# Patient Record
Sex: FEMALE | HISPANIC OR LATINO | Employment: UNEMPLOYED
[De-identification: names, ages, dates, MRNs, and addresses within clinical notes are randomized per-mention and may not be internally consistent; named-entity substitution may affect disease eponyms.]

---

## 2017-01-04 ENCOUNTER — CHARTING TRANS (OUTPATIENT)
Dept: OTHER | Age: 52
End: 2017-01-04

## 2017-01-09 ENCOUNTER — CHARTING TRANS (OUTPATIENT)
Dept: OTHER | Age: 52
End: 2017-01-09

## 2017-05-03 ENCOUNTER — CHARTING TRANS (OUTPATIENT)
Dept: OTHER | Age: 52
End: 2017-05-03

## 2017-05-09 ENCOUNTER — CHARTING TRANS (OUTPATIENT)
Dept: URGENT CARE | Age: 52
End: 2017-05-09

## 2017-05-09 ASSESSMENT — PAIN SCALES - GENERAL: PAINLEVEL_OUTOF10: 4

## 2017-05-10 ENCOUNTER — CHARTING TRANS (OUTPATIENT)
Dept: INTERNAL MEDICINE | Age: 52
End: 2017-05-10

## 2017-05-10 ENCOUNTER — CHARTING TRANS (OUTPATIENT)
Dept: OTHER | Age: 52
End: 2017-05-10

## 2017-05-11 ENCOUNTER — LAB SERVICES (OUTPATIENT)
Dept: OTHER | Age: 52
End: 2017-05-11

## 2017-05-11 LAB
C PARVUM AG STL QL IA: NEGATIVE
G LAMBLIA AG STL QL IA: NEGATIVE

## 2017-05-17 ENCOUNTER — CHARTING TRANS (OUTPATIENT)
Dept: OTHER | Age: 52
End: 2017-05-17

## 2017-05-23 ENCOUNTER — CHARTING TRANS (OUTPATIENT)
Dept: OTHER | Age: 52
End: 2017-05-23

## 2017-06-02 ENCOUNTER — LAB SERVICES (OUTPATIENT)
Dept: OTHER | Age: 52
End: 2017-06-02

## 2017-06-02 ENCOUNTER — CHARTING TRANS (OUTPATIENT)
Dept: INTERNAL MEDICINE | Age: 52
End: 2017-06-02

## 2017-06-02 ENCOUNTER — CHARTING TRANS (OUTPATIENT)
Dept: OTHER | Age: 52
End: 2017-06-02

## 2017-06-02 LAB
BILIRUBIN URINE: NEGATIVE
BLOOD URINE: ABNORMAL
CLARITY: ABNORMAL
COLOR: YELLOW
GLUCOSE QUALITATIVE U: NEGATIVE
KETONES, URINE: NEGATIVE
LEUKOCYTE ESTERASE URINE: NEGATIVE
NITRITE URINE: NEGATIVE
PH URINE: 6 (ref 5–7)
SPECIFIC GRAVITY URINE: 1.02 (ref 1–1.03)
URINE PROTEIN, QUAL (DIPSTICK): 100
UROBILINOGEN URINE: <2

## 2017-06-05 ENCOUNTER — CHARTING TRANS (OUTPATIENT)
Dept: OTHER | Age: 52
End: 2017-06-05

## 2017-06-05 LAB — FINAL REPORT: NORMAL

## 2017-06-08 ENCOUNTER — CHARTING TRANS (OUTPATIENT)
Dept: OTHER | Age: 52
End: 2017-06-08

## 2017-06-23 ENCOUNTER — CHARTING TRANS (OUTPATIENT)
Dept: OTHER | Age: 52
End: 2017-06-23

## 2017-06-23 ENCOUNTER — LAB SERVICES (OUTPATIENT)
Dept: OTHER | Age: 52
End: 2017-06-23

## 2017-06-23 ENCOUNTER — IMAGING SERVICES (OUTPATIENT)
Dept: OTHER | Age: 52
End: 2017-06-23

## 2017-06-23 LAB
CREATININE, SERUM: 0.65 MG/DL (ref 0.4–1.4)
GFR AFRICAN AMERICAN: NORMAL ML/MIN/1.73SQ.M.
GFR NON AFRICAN AMER: >60 ML/MIN/1.73SQ.M.

## 2017-07-01 ENCOUNTER — HOSPITAL ENCOUNTER (EMERGENCY)
Age: 52
Discharge: HOME | End: 2017-07-01
Attending: EMERGENCY MEDICINE
Payer: COMMERCIAL

## 2017-07-01 ENCOUNTER — APPOINTMENT (OUTPATIENT)
Dept: CT IMAGING | Age: 52
End: 2017-07-01
Attending: EMERGENCY MEDICINE
Payer: COMMERCIAL

## 2017-07-01 VITALS
OXYGEN SATURATION: 98 % | HEART RATE: 72 BPM | RESPIRATION RATE: 20 BRPM | DIASTOLIC BLOOD PRESSURE: 68 MMHG | HEIGHT: 62 IN | BODY MASS INDEX: 33.49 KG/M2 | WEIGHT: 182 LBS | SYSTOLIC BLOOD PRESSURE: 124 MMHG | TEMPERATURE: 98 F

## 2017-07-01 DIAGNOSIS — N20.0 KIDNEY STONE: Primary | ICD-10-CM

## 2017-07-01 LAB
ALBUMIN SERPL-MCNC: 3.4 G/DL (ref 3.5–4.8)
ALP LIVER SERPL-CCNC: 92 U/L (ref 39–100)
ALT SERPL-CCNC: 29 U/L (ref 14–54)
AST SERPL-CCNC: 21 U/L (ref 15–41)
BASOPHILS # BLD AUTO: 0.04 X10(3) UL (ref 0–0.1)
BASOPHILS NFR BLD AUTO: 0.3 %
BILIRUB SERPL-MCNC: 0.3 MG/DL (ref 0.1–2)
BILIRUB UR QL STRIP.AUTO: NEGATIVE
BUN BLD-MCNC: 13 MG/DL (ref 8–20)
CALCIUM BLD-MCNC: 8.7 MG/DL (ref 8.3–10.3)
CHLORIDE: 107 MMOL/L (ref 101–111)
CO2: 26 MMOL/L (ref 22–32)
COLOR UR AUTO: YELLOW
CREAT BLD-MCNC: 0.84 MG/DL (ref 0.55–1.02)
EOSINOPHIL # BLD AUTO: 0.19 X10(3) UL (ref 0–0.3)
EOSINOPHIL NFR BLD AUTO: 1.6 %
ERYTHROCYTE [DISTWIDTH] IN BLOOD BY AUTOMATED COUNT: 13.6 % (ref 11.5–16)
GLUCOSE BLD-MCNC: 138 MG/DL (ref 70–99)
GLUCOSE UR STRIP.AUTO-MCNC: NEGATIVE MG/DL
HCT VFR BLD AUTO: 38 % (ref 34–50)
HGB BLD-MCNC: 12.4 G/DL (ref 12–16)
IMMATURE GRANULOCYTE COUNT: 0.04 X10(3) UL (ref 0–1)
IMMATURE GRANULOCYTE RATIO %: 0.3 %
LEUKOCYTE ESTERASE UR QL STRIP.AUTO: NEGATIVE
LYMPHOCYTES # BLD AUTO: 3.43 X10(3) UL (ref 0.9–4)
LYMPHOCYTES NFR BLD AUTO: 28.7 %
M PROTEIN MFR SERPL ELPH: 7.5 G/DL (ref 6.1–8.3)
MCH RBC QN AUTO: 28.1 PG (ref 27–33.2)
MCHC RBC AUTO-ENTMCNC: 32.6 G/DL (ref 31–37)
MCV RBC AUTO: 86.2 FL (ref 81–100)
MONOCYTES # BLD AUTO: 0.73 X10(3) UL (ref 0.1–0.6)
MONOCYTES NFR BLD AUTO: 6.1 %
NEUTROPHIL ABS PRELIM: 7.53 X10 (3) UL (ref 1.3–6.7)
NEUTROPHILS # BLD AUTO: 7.53 X10(3) UL (ref 1.3–6.7)
NEUTROPHILS NFR BLD AUTO: 63 %
NITRITE UR QL STRIP.AUTO: NEGATIVE
PH UR STRIP.AUTO: 6 [PH] (ref 4.5–8)
PLATELET # BLD AUTO: 308 10(3)UL (ref 150–450)
POTASSIUM SERPL-SCNC: 3.7 MMOL/L (ref 3.6–5.1)
RBC # BLD AUTO: 4.41 X10(6)UL (ref 3.8–5.1)
RBC #/AREA URNS AUTO: >10 /HPF
RED CELL DISTRIBUTION WIDTH-SD: 43 FL (ref 35.1–46.3)
SODIUM SERPL-SCNC: 140 MMOL/L (ref 136–144)
SP GR UR STRIP.AUTO: >=1.03 (ref 1–1.03)
UROBILINOGEN UR STRIP.AUTO-MCNC: 0.2 MG/DL
WBC # BLD AUTO: 12 X10(3) UL (ref 4–13)

## 2017-07-01 PROCEDURE — 74176 CT ABD & PELVIS W/O CONTRAST: CPT | Performed by: EMERGENCY MEDICINE

## 2017-07-01 PROCEDURE — 80053 COMPREHEN METABOLIC PANEL: CPT

## 2017-07-01 PROCEDURE — 96375 TX/PRO/DX INJ NEW DRUG ADDON: CPT

## 2017-07-01 PROCEDURE — 99284 EMERGENCY DEPT VISIT MOD MDM: CPT

## 2017-07-01 PROCEDURE — 80053 COMPREHEN METABOLIC PANEL: CPT | Performed by: EMERGENCY MEDICINE

## 2017-07-01 PROCEDURE — 96374 THER/PROPH/DIAG INJ IV PUSH: CPT

## 2017-07-01 PROCEDURE — 85025 COMPLETE CBC W/AUTO DIFF WBC: CPT | Performed by: EMERGENCY MEDICINE

## 2017-07-01 PROCEDURE — 81001 URINALYSIS AUTO W/SCOPE: CPT | Performed by: EMERGENCY MEDICINE

## 2017-07-01 PROCEDURE — 96361 HYDRATE IV INFUSION ADD-ON: CPT

## 2017-07-01 PROCEDURE — 85025 COMPLETE CBC W/AUTO DIFF WBC: CPT

## 2017-07-01 RX ORDER — HYDROCODONE BITARTRATE AND ACETAMINOPHEN 5; 325 MG/1; MG/1
1 TABLET ORAL EVERY 6 HOURS PRN
Qty: 10 TABLET | Refills: 0 | Status: ON HOLD | OUTPATIENT
Start: 2017-07-01 | End: 2018-09-04

## 2017-07-01 RX ORDER — TAMSULOSIN HYDROCHLORIDE 0.4 MG/1
0.4 CAPSULE ORAL DAILY
Qty: 7 CAPSULE | Refills: 0 | Status: SHIPPED | OUTPATIENT
Start: 2017-07-01 | End: 2017-07-08

## 2017-07-01 RX ORDER — HYDROMORPHONE HYDROCHLORIDE 1 MG/ML
1 INJECTION, SOLUTION INTRAMUSCULAR; INTRAVENOUS; SUBCUTANEOUS ONCE
Status: COMPLETED | OUTPATIENT
Start: 2017-07-01 | End: 2017-07-01

## 2017-07-01 RX ORDER — ONDANSETRON 2 MG/ML
4 INJECTION INTRAMUSCULAR; INTRAVENOUS ONCE
Status: COMPLETED | OUTPATIENT
Start: 2017-07-01 | End: 2017-07-01

## 2017-07-01 RX ORDER — KETOROLAC TROMETHAMINE 10 MG/1
10 TABLET, FILM COATED ORAL EVERY 6 HOURS PRN
Qty: 20 TABLET | Refills: 0 | Status: SHIPPED | OUTPATIENT
Start: 2017-07-01 | End: 2017-07-06

## 2017-07-01 RX ORDER — KETOROLAC TROMETHAMINE 30 MG/ML
30 INJECTION, SOLUTION INTRAMUSCULAR; INTRAVENOUS ONCE
Status: COMPLETED | OUTPATIENT
Start: 2017-07-01 | End: 2017-07-01

## 2017-07-01 NOTE — ED INITIAL ASSESSMENT (HPI)
REPORTS HEMATURIA OVER PAST WEEK AND HAS HAD A CT SCAN THRU [facility].   PT NOW WITH 2 HOUR HX OF RIGHT FLANK PAIN WITH VOMITING

## 2017-07-01 NOTE — ED PROVIDER NOTES
Patient Seen in: [facility] Emergency Department In [place]    History   Patient presents with:  Abdomen/Flank Pain (GI/)    Stated Complaint: Left FLANK PAIN    HPI    This is a pleasant 62-year-old female who presents to the ER complaining of having l ED Triage Vitals [07/01/17 0830]  BP: 134/66  Pulse: 70  Resp: 20  Temp: 98.1 °F (36.7 °C)  Temp src: Oral  SpO2: 99 %  O2 Device: None (Room air)    Current:/66   Pulse 70   Temp 98.1 °F (36.7 °C) (Oral)   Resp 20   Ht 157.5 cm (5' 2\")   Wt 82.6 Abnormality         Status                     ---------                               -----------         ------                     CBC W/ DIFFERENTIAL[[id number]]          Abnormal            Final result                 Please view results for these kandace Clinical Impression:  Kidney stone  (primary encounter diagnosis)    Disposition:  Discharge    Follow-up:  Zainab Bunch MD  [address] [place] [address]  [phone]    Schedule an appointment as soon as possible for a visit in 2 days

## 2017-07-03 ENCOUNTER — CHARTING TRANS (OUTPATIENT)
Dept: UROLOGY | Age: 52
End: 2017-07-03

## 2017-07-03 ENCOUNTER — CHARTING TRANS (OUTPATIENT)
Dept: OTHER | Age: 52
End: 2017-07-03

## 2017-07-03 ENCOUNTER — IMAGING SERVICES (OUTPATIENT)
Dept: OTHER | Age: 52
End: 2017-07-03

## 2017-07-06 ENCOUNTER — CHARTING TRANS (OUTPATIENT)
Dept: OTHER | Age: 52
End: 2017-07-06

## 2017-07-07 ENCOUNTER — CHARTING TRANS (OUTPATIENT)
Dept: OTHER | Age: 52
End: 2017-07-07

## 2017-07-08 ENCOUNTER — LAB SERVICES (OUTPATIENT)
Dept: OTHER | Age: 52
End: 2017-07-08

## 2017-07-08 LAB
ALBUMIN SERPL BCG-MCNC: 3.9 G/DL (ref 3.6–5.1)
ALP SERPL-CCNC: 86 U/L (ref 45–105)
ALT SERPL W/O P-5'-P-CCNC: 41 U/L (ref 15–43)
AST SERPL-CCNC: 26 U/L (ref 14–43)
BASOPHIL %: 0.3 % (ref 0–1.2)
BASOPHIL ABSOLUTE #: 0 10*3/UL (ref 0–0.1)
BILIRUB SERPL-MCNC: 0.5 MG/DL (ref 0–1.3)
BUN SERPL-MCNC: 14 MG/DL (ref 7–20)
CALCIUM SERPL-MCNC: 9 MG/DL (ref 8.6–10.6)
CHLORIDE SERPL-SCNC: 109 MMOL/L (ref 96–107)
CHOLEST SERPL-MCNC: 209 MG/DL (ref 140–200)
CK SERPL-CCNC: 48 U/L (ref 30–135)
CREATININE, SERUM: 0.7 MG/DL (ref 0.5–1.4)
DIFFERENTIAL TYPE: ABNORMAL
EOSINOPHIL %: 2.9 % (ref 0–10)
EOSINOPHIL ABSOLUTE #: 0.4 10*3/UL (ref 0–0.5)
GFR SERPL CREATININE-BSD FRML MDRD: >60 ML/MIN/{1.73M2}
GFR SERPL CREATININE-BSD FRML MDRD: >60 ML/MIN/{1.73M2}
GLUCOSE P FAST SERPL-MCNC: 112 MG/DL (ref 60–100)
HCO3 SERPL-SCNC: 25 MMOL/L (ref 22–32)
HDLC SERPL-MCNC: 39 MG/DL
HEMATOCRIT: 38.5 % (ref 34–45)
HEMOGLOBIN: 12.6 G/DL (ref 11.2–15.7)
LDLC SERPL CALC-MCNC: 134 MG/DL (ref 30–100)
LYMPH PERCENT: 28.3 % (ref 20.5–51.1)
LYMPHOCYTE ABSOLUTE #: 3.4 10*3/UL (ref 1.2–3.4)
MEAN CORPUSCULAR HGB CONCENTRATION: 32.7 % (ref 32–36)
MEAN CORPUSCULAR HGB: 28.4 PG (ref 27–34)
MEAN CORPUSCULAR VOLUME: 86.7 FL (ref 79–95)
MEAN PLATELET VOLUME: 8.6 FL (ref 8.6–12.4)
MONOCYTE ABSOLUTE #: 0.7 10*3/UL (ref 0.2–0.9)
MONOCYTE PERCENT: 5.8 % (ref 4.3–12.9)
NEUTROPHIL ABSOLUTE #: 7.6 10*3/UL (ref 1.4–6.5)
NEUTROPHIL PERCENT: 62.7 % (ref 34–73.5)
PLATELET COUNT: 354 10*3/UL (ref 150–400)
POTASSIUM SERPL-SCNC: 4.2 MMOL/L (ref 3.5–5.3)
PROT SERPL-MCNC: 7.3 G/DL (ref 6.4–8.5)
RED BLOOD CELL COUNT: 4.44 10*6/UL (ref 3.7–5.2)
RED CELL DISTRIBUTION WIDTH: 14.4 % (ref 11.3–14.8)
SODIUM SERPL-SCNC: 144 MMOL/L (ref 136–146)
TRIGL SERPL-MCNC: 181 MG/DL (ref 0–200)
TSH SERPL DL<=0.05 MIU/L-ACNC: 1.02 M[IU]/L (ref 0.3–4.82)
WHITE BLOOD CELL COUNT: 12.1 10*3/UL (ref 4–10)

## 2017-07-10 ENCOUNTER — CHARTING TRANS (OUTPATIENT)
Dept: OTHER | Age: 52
End: 2017-07-10

## 2017-07-11 ENCOUNTER — LAB SERVICES (OUTPATIENT)
Dept: OTHER | Age: 52
End: 2017-07-11

## 2017-07-11 ENCOUNTER — CHARTING TRANS (OUTPATIENT)
Dept: OTHER | Age: 52
End: 2017-07-11

## 2017-07-11 ENCOUNTER — CHARTING TRANS (OUTPATIENT)
Dept: INTERNAL MEDICINE | Age: 52
End: 2017-07-11

## 2017-07-11 LAB
BILIRUBIN URINE: NEGATIVE
BLOOD URINE: ABNORMAL
CLARITY: ABNORMAL
COLOR: YELLOW
GLUCOSE QUALITATIVE U: NEGATIVE
KETONES, URINE: NEGATIVE
LEUKOCYTE ESTERASE URINE: ABNORMAL
NITRITE URINE: NEGATIVE
PH URINE: 5 (ref 5–7)
SPECIFIC GRAVITY URINE: 1.02 (ref 1–1.03)
URINE PROTEIN, QUAL (DIPSTICK): 30
UROBILINOGEN URINE: <2

## 2017-07-12 LAB — FINAL REPORT: NORMAL

## 2017-07-13 ENCOUNTER — CHARTING TRANS (OUTPATIENT)
Dept: OTHER | Age: 52
End: 2017-07-13

## 2017-07-14 ENCOUNTER — CHARTING TRANS (OUTPATIENT)
Dept: OTHER | Age: 52
End: 2017-07-14

## 2017-08-09 ENCOUNTER — IMAGING SERVICES (OUTPATIENT)
Dept: OTHER | Age: 52
End: 2017-08-09

## 2017-08-09 ENCOUNTER — CHARTING TRANS (OUTPATIENT)
Dept: OTHER | Age: 52
End: 2017-08-09

## 2017-08-09 ENCOUNTER — CHARTING TRANS (OUTPATIENT)
Dept: UROLOGY | Age: 52
End: 2017-08-09

## 2017-08-15 ENCOUNTER — CHARTING TRANS (OUTPATIENT)
Dept: OTHER | Age: 52
End: 2017-08-15

## 2017-08-21 ENCOUNTER — IMAGING SERVICES (OUTPATIENT)
Dept: OTHER | Age: 52
End: 2017-08-21

## 2017-11-08 ENCOUNTER — CHARTING TRANS (OUTPATIENT)
Dept: OTHER | Age: 52
End: 2017-11-08

## 2018-02-18 ENCOUNTER — HOSPITAL ENCOUNTER (EMERGENCY)
Age: 53
Discharge: HOME | End: 2018-02-19
Attending: EMERGENCY MEDICINE
Payer: COMMERCIAL

## 2018-02-18 DIAGNOSIS — N23 RENAL COLIC: Primary | ICD-10-CM

## 2018-02-18 PROCEDURE — 96375 TX/PRO/DX INJ NEW DRUG ADDON: CPT

## 2018-02-18 PROCEDURE — 99284 EMERGENCY DEPT VISIT MOD MDM: CPT

## 2018-02-18 PROCEDURE — 96374 THER/PROPH/DIAG INJ IV PUSH: CPT

## 2018-02-19 ENCOUNTER — APPOINTMENT (OUTPATIENT)
Dept: CT IMAGING | Age: 53
End: 2018-02-19
Attending: EMERGENCY MEDICINE
Payer: COMMERCIAL

## 2018-02-19 VITALS
RESPIRATION RATE: 16 BRPM | OXYGEN SATURATION: 99 % | TEMPERATURE: 99 F | HEART RATE: 66 BPM | HEIGHT: 61 IN | DIASTOLIC BLOOD PRESSURE: 71 MMHG | WEIGHT: 245 LBS | SYSTOLIC BLOOD PRESSURE: 128 MMHG | BODY MASS INDEX: 46.26 KG/M2

## 2018-02-19 LAB
BILIRUB UR QL STRIP.AUTO: NEGATIVE
BUN BLD-MCNC: 16 MG/DL (ref 8–20)
CALCIUM BLD-MCNC: 9 MG/DL (ref 8.3–10.3)
CHLORIDE: 107 MMOL/L (ref 101–111)
CO2: 28 MMOL/L (ref 22–32)
COLOR UR AUTO: YELLOW
CREAT BLD-MCNC: 0.99 MG/DL (ref 0.55–1.02)
GLUCOSE BLD-MCNC: 151 MG/DL (ref 70–99)
GLUCOSE UR STRIP.AUTO-MCNC: NEGATIVE MG/DL
LEUKOCYTE ESTERASE UR QL STRIP.AUTO: NEGATIVE
NITRITE UR QL STRIP.AUTO: NEGATIVE
PH UR STRIP.AUTO: 6 [PH] (ref 4.5–8)
POTASSIUM SERPL-SCNC: 3.6 MMOL/L (ref 3.6–5.1)
RBC #/AREA URNS AUTO: >10 /HPF
SODIUM SERPL-SCNC: 142 MMOL/L (ref 136–144)
SP GR UR STRIP.AUTO: >=1.03 (ref 1–1.03)
UROBILINOGEN UR STRIP.AUTO-MCNC: 0.2 MG/DL

## 2018-02-19 PROCEDURE — 81001 URINALYSIS AUTO W/SCOPE: CPT | Performed by: EMERGENCY MEDICINE

## 2018-02-19 PROCEDURE — 80048 BASIC METABOLIC PNL TOTAL CA: CPT | Performed by: EMERGENCY MEDICINE

## 2018-02-19 PROCEDURE — 74176 CT ABD & PELVIS W/O CONTRAST: CPT | Performed by: EMERGENCY MEDICINE

## 2018-02-19 RX ORDER — KETOROLAC TROMETHAMINE 30 MG/ML
30 INJECTION, SOLUTION INTRAMUSCULAR; INTRAVENOUS ONCE
Status: COMPLETED | OUTPATIENT
Start: 2018-02-19 | End: 2018-02-19

## 2018-02-19 RX ORDER — HYDROCODONE BITARTRATE AND ACETAMINOPHEN 5; 325 MG/1; MG/1
1 TABLET ORAL EVERY 6 HOURS PRN
Qty: 20 TABLET | Refills: 0 | Status: SHIPPED | OUTPATIENT
Start: 2018-02-19 | End: 2018-03-01

## 2018-02-19 RX ORDER — ONDANSETRON 2 MG/ML
4 INJECTION INTRAMUSCULAR; INTRAVENOUS ONCE
Status: COMPLETED | OUTPATIENT
Start: 2018-02-19 | End: 2018-02-19

## 2018-02-19 RX ORDER — ONDANSETRON 4 MG/1
4 TABLET, ORALLY DISINTEGRATING ORAL EVERY 4 HOURS PRN
Qty: 10 TABLET | Refills: 0 | Status: SHIPPED | OUTPATIENT
Start: 2018-02-19 | End: 2018-02-26

## 2018-02-19 NOTE — ED PROVIDER NOTES
Patient Seen in: [facility] In [place]    History   Patient presents with:  Abdomen/Flank Pain (GI/)  Nausea/Vomiting/Diarrhea (gastrointestinal)  Headache (neurologic)  Constipation (gastrointestinal)    Stated Complaint: RIGHT JAYCE nontender. Extremities: No evidence of deformity. No clubbing or cyanosis. Neuro: No focal deficit is noted.     ED Course     Labs Reviewed   BASIC METABOLIC PANEL (8) - Abnormal; Notable for the following:        Result Value    Glucose 151 (*)     All

## 2018-02-19 NOTE — ED INITIAL ASSESSMENT (HPI)
Pt to ed co right sided abd pain that started 3 hrs ago after eating pizza states flank pain \"in the middle\" reports 3 emesis no diarrhea reports constipation also states \"bad pain in back of head that started after vomiting

## 2018-03-21 ENCOUNTER — CHARTING TRANS (OUTPATIENT)
Dept: OTHER | Age: 53
End: 2018-03-21

## 2018-03-28 ENCOUNTER — CHARTING TRANS (OUTPATIENT)
Dept: OTHER | Age: 53
End: 2018-03-28

## 2018-04-16 ENCOUNTER — LAB SERVICES (OUTPATIENT)
Dept: OTHER | Age: 53
End: 2018-04-16

## 2018-04-16 LAB
ALBUMIN SERPL-MCNC: 4.1 G/DL (ref 3.6–5.1)
ALP SERPL-CCNC: 107 U/L (ref 45–105)
ALT SERPL-CCNC: 33 U/L (ref 15–43)
AST SERPL-CCNC: 21 U/L (ref 14–43)
BILIRUB SERPL-MCNC: 0.5 MG/DL (ref 0–1.3)
BUN SERPL-MCNC: 14 MG/DL (ref 7–20)
CALCIUM SERPL-MCNC: 9.4 MG/DL (ref 8.6–10.6)
CHLORIDE SERPL-SCNC: 106 MMOL/L (ref 96–107)
CHOLEST SERPL-MCNC: 199 MG/DL (ref 140–200)
CO2 SERPL-SCNC: 25 MMOL/L (ref 22–32)
CREAT SERPL-MCNC: 0.6 MG/DL (ref 0.5–1.4)
GFR SERPL CREATININE-BSD FRML MDRD: >60 ML/MIN/{1.73M2}
GFR SERPL CREATININE-BSD FRML MDRD: >60 ML/MIN/{1.73M2}
GLUCOSE P FAST SERPL-MCNC: 138 MG/DL (ref 60–100)
HDLC SERPL-MCNC: 37 MG/DL
LDLC SERPL CALC-MCNC: 119 MG/DL (ref 30–100)
POTASSIUM SERPL-SCNC: 3.8 MMOL/L (ref 3.5–5.3)
PROT SERPL-MCNC: 7.6 G/DL (ref 6.4–8.5)
SODIUM SERPL-SCNC: 142 MMOL/L (ref 136–146)
TRIGL SERPL-MCNC: 217 MG/DL (ref 0–200)

## 2018-04-19 ENCOUNTER — CHARTING TRANS (OUTPATIENT)
Dept: OTHER | Age: 53
End: 2018-04-19

## 2018-04-19 ENCOUNTER — LAB SERVICES (OUTPATIENT)
Dept: OTHER | Age: 53
End: 2018-04-19

## 2018-04-19 LAB
EST. AVERAGE GLUCOSE BLD GHB EST-MCNC: 134 MG/DL (ref 0–154)
HBA1C BLD-MCNC: 6.3 % (ref 4.2–6)

## 2018-04-20 LAB
ALBUMIN/CREAT UR: 5.5 MG/G (ref 0–30)
CREAT UR-MCNC: 181.8 MG/DL
MICROALBUMIN UR-MCNC: 10 MG/L

## 2018-04-23 ENCOUNTER — CHARTING TRANS (OUTPATIENT)
Dept: OTHER | Age: 53
End: 2018-04-23

## 2018-04-24 ENCOUNTER — CHARTING TRANS (OUTPATIENT)
Dept: OTHER | Age: 53
End: 2018-04-24

## 2018-05-01 ENCOUNTER — CHARTING TRANS (OUTPATIENT)
Dept: OTHER | Age: 53
End: 2018-05-01

## 2018-07-17 ENCOUNTER — CHARTING TRANS (OUTPATIENT)
Dept: OTHER | Age: 53
End: 2018-07-17

## 2018-09-03 ENCOUNTER — HOSPITAL ENCOUNTER (INPATIENT)
Facility: HOSPITAL | Age: 53
LOS: 1 days | Discharge: HOME | DRG: 690 | End: 2018-09-05
Attending: EMERGENCY MEDICINE | Admitting: INTERNAL MEDICINE
Payer: COMMERCIAL

## 2018-09-03 ENCOUNTER — APPOINTMENT (OUTPATIENT)
Dept: CT IMAGING | Age: 53
DRG: 690 | End: 2018-09-03
Attending: EMERGENCY MEDICINE
Payer: COMMERCIAL

## 2018-09-03 DIAGNOSIS — D72.829 LEUKOCYTOSIS, UNSPECIFIED TYPE: ICD-10-CM

## 2018-09-03 DIAGNOSIS — N13.2 URETERAL STONE WITH HYDRONEPHROSIS: Primary | ICD-10-CM

## 2018-09-03 DIAGNOSIS — N12 PYELONEPHRITIS: ICD-10-CM

## 2018-09-03 PROBLEM — R73.9 HYPERGLYCEMIA: Status: ACTIVE | Noted: 2018-09-03

## 2018-09-03 PROBLEM — E87.6 HYPOKALEMIA: Status: ACTIVE | Noted: 2018-09-03

## 2018-09-03 PROBLEM — R79.89 AZOTEMIA: Status: ACTIVE | Noted: 2018-09-03

## 2018-09-03 LAB
ALBUMIN SERPL-MCNC: 3.7 G/DL (ref 3.5–4.8)
ALBUMIN/GLOB SERPL: 0.9 {RATIO} (ref 1–2)
ALP LIVER SERPL-CCNC: 92 U/L (ref 39–100)
ALT SERPL-CCNC: 29 U/L (ref 14–54)
ANION GAP SERPL CALC-SCNC: 10 MMOL/L (ref 0–18)
AST SERPL-CCNC: 20 U/L (ref 15–41)
BASOPHILS # BLD AUTO: 0.05 X10(3) UL (ref 0–0.1)
BASOPHILS NFR BLD AUTO: 0.3 %
BILIRUB SERPL-MCNC: 0.4 MG/DL (ref 0.1–2)
BUN BLD-MCNC: 15 MG/DL (ref 8–20)
BUN/CREAT SERPL: 20.5 (ref 10–20)
CALCIUM BLD-MCNC: 9 MG/DL (ref 8.3–10.3)
CHLORIDE SERPL-SCNC: 106 MMOL/L (ref 101–111)
CO2 SERPL-SCNC: 24 MMOL/L (ref 22–32)
CREAT BLD-MCNC: 0.73 MG/DL (ref 0.55–1.02)
EOSINOPHIL # BLD AUTO: 0.23 X10(3) UL (ref 0–0.3)
EOSINOPHIL NFR BLD AUTO: 1.6 %
ERYTHROCYTE [DISTWIDTH] IN BLOOD BY AUTOMATED COUNT: 13.4 % (ref 11.5–16)
GLOBULIN PLAS-MCNC: 4.1 G/DL (ref 2.5–4)
GLUCOSE BLD-MCNC: 102 MG/DL (ref 70–99)
GLUCOSE UR STRIP.AUTO-MCNC: NEGATIVE MG/DL
HCT VFR BLD AUTO: 39.6 % (ref 34–50)
HGB BLD-MCNC: 12.4 G/DL (ref 12–16)
IMMATURE GRANULOCYTE COUNT: 0.04 X10(3) UL (ref 0–1)
IMMATURE GRANULOCYTE RATIO %: 0.3 %
LEUKOCYTE ESTERASE UR QL STRIP.AUTO: NEGATIVE
LYMPHOCYTES # BLD AUTO: 3.83 X10(3) UL (ref 0.9–4)
LYMPHOCYTES NFR BLD AUTO: 26.1 %
M PROTEIN MFR SERPL ELPH: 7.8 G/DL (ref 6.1–8.3)
MCH RBC QN AUTO: 27.7 PG (ref 27–33.2)
MCHC RBC AUTO-ENTMCNC: 31.3 G/DL (ref 31–37)
MCV RBC AUTO: 88.4 FL (ref 81–100)
MONOCYTES # BLD AUTO: 0.89 X10(3) UL (ref 0.1–1)
MONOCYTES NFR BLD AUTO: 6.1 %
NEUTROPHIL ABS PRELIM: 9.62 X10 (3) UL (ref 1.3–6.7)
NEUTROPHILS # BLD AUTO: 9.62 X10(3) UL (ref 1.3–6.7)
NEUTROPHILS NFR BLD AUTO: 65.6 %
NITRITE UR QL STRIP.AUTO: POSITIVE
OSMOLALITY SERPL CALC.SUM OF ELEC: 291 MOSM/KG (ref 275–295)
PH UR STRIP.AUTO: 5 [PH] (ref 4.5–8)
PLATELET # BLD AUTO: 343 10(3)UL (ref 150–450)
POTASSIUM SERPL-SCNC: 3.5 MMOL/L (ref 3.6–5.1)
RBC # BLD AUTO: 4.48 X10(6)UL (ref 3.8–5.1)
RBC #/AREA URNS AUTO: >10 /HPF
RED CELL DISTRIBUTION WIDTH-SD: 43.5 FL (ref 35.1–46.3)
SODIUM SERPL-SCNC: 140 MMOL/L (ref 136–144)
SP GR UR STRIP.AUTO: >=1.03 (ref 1–1.03)
UROBILINOGEN UR STRIP.AUTO-MCNC: 0.2 MG/DL
WBC # BLD AUTO: 14.7 X10(3) UL (ref 4–13)

## 2018-09-03 PROCEDURE — 74176 CT ABD & PELVIS W/O CONTRAST: CPT | Performed by: EMERGENCY MEDICINE

## 2018-09-03 RX ORDER — KETOROLAC TROMETHAMINE 30 MG/ML
30 INJECTION, SOLUTION INTRAMUSCULAR; INTRAVENOUS ONCE
Status: COMPLETED | OUTPATIENT
Start: 2018-09-03 | End: 2018-09-03

## 2018-09-03 RX ORDER — ONDANSETRON 2 MG/ML
4 INJECTION INTRAMUSCULAR; INTRAVENOUS ONCE
Status: COMPLETED | OUTPATIENT
Start: 2018-09-03 | End: 2018-09-03

## 2018-09-04 ENCOUNTER — ANESTHESIA (OUTPATIENT)
Dept: SURGERY | Facility: HOSPITAL | Age: 53
DRG: 690 | End: 2018-09-04
Payer: COMMERCIAL

## 2018-09-04 ENCOUNTER — ANESTHESIA EVENT (OUTPATIENT)
Dept: SURGERY | Facility: HOSPITAL | Age: 53
DRG: 690 | End: 2018-09-04
Payer: COMMERCIAL

## 2018-09-04 ENCOUNTER — APPOINTMENT (OUTPATIENT)
Dept: GENERAL RADIOLOGY | Facility: HOSPITAL | Age: 53
DRG: 690 | End: 2018-09-04
Attending: UROLOGY
Payer: COMMERCIAL

## 2018-09-04 PROBLEM — E78.00 PURE HYPERCHOLESTEROLEMIA: Chronic | Status: ACTIVE | Noted: 2018-09-04

## 2018-09-04 PROBLEM — D72.829 LEUKOCYTOSIS, UNSPECIFIED TYPE: Status: ACTIVE | Noted: 2018-09-04

## 2018-09-04 PROBLEM — N12 PYELONEPHRITIS: Status: ACTIVE | Noted: 2018-09-04

## 2018-09-04 PROBLEM — I10 ESSENTIAL HYPERTENSION: Chronic | Status: ACTIVE | Noted: 2018-09-04

## 2018-09-04 LAB
ANION GAP SERPL CALC-SCNC: 4 MMOL/L (ref 0–18)
BASOPHILS # BLD AUTO: 0.04 X10(3) UL (ref 0–0.1)
BASOPHILS NFR BLD AUTO: 0.3 %
BUN BLD-MCNC: 11 MG/DL (ref 8–20)
BUN/CREAT SERPL: 18.6 (ref 10–20)
CALCIUM BLD-MCNC: 8.1 MG/DL (ref 8.3–10.3)
CHLORIDE SERPL-SCNC: 113 MMOL/L (ref 101–111)
CO2 SERPL-SCNC: 26 MMOL/L (ref 22–32)
CREAT BLD-MCNC: 0.59 MG/DL (ref 0.55–1.02)
EOSINOPHIL # BLD AUTO: 0.24 X10(3) UL (ref 0–0.3)
EOSINOPHIL NFR BLD AUTO: 1.9 %
ERYTHROCYTE [DISTWIDTH] IN BLOOD BY AUTOMATED COUNT: 13.4 % (ref 11.5–16)
GENTAMICIN PEAK SERPL-MCNC: 8.1 UG/ML (ref 5–10)
GENTAMICIN TROUGH SERPL-MCNC: 0.9 UG/ML (ref 1–2)
GENTAMICIN TROUGH SERPL-MCNC: 1 UG/ML (ref 1–2)
GLUCOSE BLD-MCNC: 94 MG/DL (ref 70–99)
HCT VFR BLD AUTO: 36.9 % (ref 34–50)
HGB BLD-MCNC: 11.8 G/DL (ref 12–16)
IMMATURE GRANULOCYTE COUNT: 0.04 X10(3) UL (ref 0–1)
IMMATURE GRANULOCYTE RATIO %: 0.3 %
LYMPHOCYTES # BLD AUTO: 4.46 X10(3) UL (ref 0.9–4)
LYMPHOCYTES NFR BLD AUTO: 34.4 %
MCH RBC QN AUTO: 28.2 PG (ref 27–33.2)
MCHC RBC AUTO-ENTMCNC: 32 G/DL (ref 31–37)
MCV RBC AUTO: 88.1 FL (ref 81–100)
MONOCYTES # BLD AUTO: 0.83 X10(3) UL (ref 0.1–1)
MONOCYTES NFR BLD AUTO: 6.4 %
NEUTROPHIL ABS PRELIM: 7.34 X10 (3) UL (ref 1.3–6.7)
NEUTROPHILS # BLD AUTO: 7.34 X10(3) UL (ref 1.3–6.7)
NEUTROPHILS NFR BLD AUTO: 56.7 %
OSMOLALITY SERPL CALC.SUM OF ELEC: 295 MOSM/KG (ref 275–295)
PLATELET # BLD AUTO: 273 10(3)UL (ref 150–450)
POTASSIUM SERPL-SCNC: 3.5 MMOL/L (ref 3.6–5.1)
POTASSIUM SERPL-SCNC: 4.1 MMOL/L (ref 3.6–5.1)
RBC # BLD AUTO: 4.19 X10(6)UL (ref 3.8–5.1)
RED CELL DISTRIBUTION WIDTH-SD: 43 FL (ref 35.1–46.3)
SODIUM SERPL-SCNC: 143 MMOL/L (ref 136–144)
WBC # BLD AUTO: 13 X10(3) UL (ref 4–13)

## 2018-09-04 PROCEDURE — 74420 UROGRAPHY RTRGR +-KUB: CPT | Performed by: UROLOGY

## 2018-09-04 PROCEDURE — 0T768DZ DILATION OF RIGHT URETER WITH INTRALUMINAL DEVICE, VIA NATURAL OR ARTIFICIAL OPENING ENDOSCOPIC: ICD-10-PCS | Performed by: UROLOGY

## 2018-09-04 PROCEDURE — BT1DYZZ FLUOROSCOPY OF RIGHT KIDNEY, URETER AND BLADDER USING OTHER CONTRAST: ICD-10-PCS | Performed by: UROLOGY

## 2018-09-04 PROCEDURE — 99220 INITIAL OBSERVATION CARE,LEVL III: CPT | Performed by: HOSPITALIST

## 2018-09-04 DEVICE — STENT URET 6F 22CM INL OPT: Type: IMPLANTABLE DEVICE | Site: URETER | Status: FUNCTIONAL

## 2018-09-04 RX ORDER — DOCUSATE SODIUM 100 MG/1
100 CAPSULE, LIQUID FILLED ORAL 2 TIMES DAILY
Status: DISCONTINUED | OUTPATIENT
Start: 2018-09-04 | End: 2018-09-05

## 2018-09-04 RX ORDER — ACETAMINOPHEN 325 MG/1
650 TABLET ORAL EVERY 4 HOURS PRN
Status: DISCONTINUED | OUTPATIENT
Start: 2018-09-04 | End: 2018-09-05

## 2018-09-04 RX ORDER — MORPHINE SULFATE 4 MG/ML
2 INJECTION, SOLUTION INTRAMUSCULAR; INTRAVENOUS EVERY 2 HOUR PRN
Status: DISCONTINUED | OUTPATIENT
Start: 2018-09-04 | End: 2018-09-04

## 2018-09-04 RX ORDER — POLYETHYLENE GLYCOL 3350 17 G/17G
17 POWDER, FOR SOLUTION ORAL DAILY PRN
Status: DISCONTINUED | OUTPATIENT
Start: 2018-09-04 | End: 2018-09-05

## 2018-09-04 RX ORDER — ALFUZOSIN HYDROCHLORIDE 10 MG/1
10 TABLET, EXTENDED RELEASE ORAL
Status: DISCONTINUED | OUTPATIENT
Start: 2018-09-05 | End: 2018-09-05

## 2018-09-04 RX ORDER — METOCLOPRAMIDE HYDROCHLORIDE 5 MG/ML
10 INJECTION INTRAMUSCULAR; INTRAVENOUS AS NEEDED
Status: DISCONTINUED | OUTPATIENT
Start: 2018-09-04 | End: 2018-09-04 | Stop reason: HOSPADM

## 2018-09-04 RX ORDER — PANTOPRAZOLE SODIUM 40 MG/1
40 TABLET, DELAYED RELEASE ORAL
Status: DISCONTINUED | OUTPATIENT
Start: 2018-09-04 | End: 2018-09-05

## 2018-09-04 RX ORDER — OXYBUTYNIN CHLORIDE 5 MG/1
5 TABLET ORAL EVERY 6 HOURS PRN
Status: DISCONTINUED | OUTPATIENT
Start: 2018-09-04 | End: 2018-09-05

## 2018-09-04 RX ORDER — MORPHINE SULFATE 4 MG/ML
1 INJECTION, SOLUTION INTRAMUSCULAR; INTRAVENOUS EVERY 2 HOUR PRN
Status: DISCONTINUED | OUTPATIENT
Start: 2018-09-04 | End: 2018-09-05

## 2018-09-04 RX ORDER — LOSARTAN POTASSIUM 100 MG/1
100 TABLET ORAL DAILY
Status: DISCONTINUED | OUTPATIENT
Start: 2018-09-04 | End: 2018-09-05

## 2018-09-04 RX ORDER — POTASSIUM CHLORIDE 20 MEQ/1
40 TABLET, EXTENDED RELEASE ORAL EVERY 4 HOURS
Status: COMPLETED | OUTPATIENT
Start: 2018-09-04 | End: 2018-09-04

## 2018-09-04 RX ORDER — SODIUM CHLORIDE 9 MG/ML
INJECTION, SOLUTION INTRAVENOUS CONTINUOUS
Status: ACTIVE | OUTPATIENT
Start: 2018-09-04 | End: 2018-09-04

## 2018-09-04 RX ORDER — ONDANSETRON 2 MG/ML
4 INJECTION INTRAMUSCULAR; INTRAVENOUS EVERY 6 HOURS PRN
Status: DISCONTINUED | OUTPATIENT
Start: 2018-09-04 | End: 2018-09-05

## 2018-09-04 RX ORDER — ACETAMINOPHEN 325 MG/1
650 TABLET ORAL EVERY 6 HOURS PRN
Status: DISCONTINUED | OUTPATIENT
Start: 2018-09-04 | End: 2018-09-04

## 2018-09-04 RX ORDER — TELMISARTAN 80 MG/1
80 TABLET ORAL DAILY
COMMUNITY
End: 2020-11-12 | Stop reason: CLARIF

## 2018-09-04 RX ORDER — METOCLOPRAMIDE HYDROCHLORIDE 5 MG/ML
10 INJECTION INTRAMUSCULAR; INTRAVENOUS EVERY 8 HOURS PRN
Status: DISCONTINUED | OUTPATIENT
Start: 2018-09-04 | End: 2018-09-05

## 2018-09-04 RX ORDER — MORPHINE SULFATE 4 MG/ML
1 INJECTION, SOLUTION INTRAMUSCULAR; INTRAVENOUS EVERY 2 HOUR PRN
Status: DISCONTINUED | OUTPATIENT
Start: 2018-09-04 | End: 2018-09-04

## 2018-09-04 RX ORDER — KETOROLAC TROMETHAMINE 30 MG/ML
30 INJECTION, SOLUTION INTRAMUSCULAR; INTRAVENOUS EVERY 6 HOURS PRN
Status: DISCONTINUED | OUTPATIENT
Start: 2018-09-04 | End: 2018-09-05

## 2018-09-04 RX ORDER — KETOROLAC TROMETHAMINE 30 MG/ML
15 INJECTION, SOLUTION INTRAMUSCULAR; INTRAVENOUS EVERY 6 HOURS PRN
Status: DISCONTINUED | OUTPATIENT
Start: 2018-09-04 | End: 2018-09-05

## 2018-09-04 RX ORDER — SODIUM PHOSPHATE, DIBASIC AND SODIUM PHOSPHATE, MONOBASIC 7; 19 G/133ML; G/133ML
1 ENEMA RECTAL ONCE AS NEEDED
Status: DISCONTINUED | OUTPATIENT
Start: 2018-09-04 | End: 2018-09-05

## 2018-09-04 RX ORDER — MEPERIDINE HYDROCHLORIDE 25 MG/ML
12.5 INJECTION INTRAMUSCULAR; INTRAVENOUS; SUBCUTANEOUS AS NEEDED
Status: DISCONTINUED | OUTPATIENT
Start: 2018-09-04 | End: 2018-09-04 | Stop reason: HOSPADM

## 2018-09-04 RX ORDER — SODIUM CHLORIDE, SODIUM LACTATE, POTASSIUM CHLORIDE, CALCIUM CHLORIDE 600; 310; 30; 20 MG/100ML; MG/100ML; MG/100ML; MG/100ML
INJECTION, SOLUTION INTRAVENOUS CONTINUOUS
Status: DISCONTINUED | OUTPATIENT
Start: 2018-09-04 | End: 2018-09-04 | Stop reason: HOSPADM

## 2018-09-04 RX ORDER — ONDANSETRON 2 MG/ML
4 INJECTION INTRAMUSCULAR; INTRAVENOUS AS NEEDED
Status: DISCONTINUED | OUTPATIENT
Start: 2018-09-04 | End: 2018-09-04 | Stop reason: HOSPADM

## 2018-09-04 RX ORDER — ONDANSETRON 2 MG/ML
4 INJECTION INTRAMUSCULAR; INTRAVENOUS EVERY 6 HOURS PRN
Status: DISCONTINUED | OUTPATIENT
Start: 2018-09-04 | End: 2018-09-04

## 2018-09-04 RX ORDER — MORPHINE SULFATE 4 MG/ML
2 INJECTION, SOLUTION INTRAMUSCULAR; INTRAVENOUS EVERY 2 HOUR PRN
Status: DISCONTINUED | OUTPATIENT
Start: 2018-09-04 | End: 2018-09-05

## 2018-09-04 RX ORDER — MORPHINE SULFATE 4 MG/ML
2 INJECTION, SOLUTION INTRAMUSCULAR; INTRAVENOUS EVERY 5 MIN PRN
Status: DISCONTINUED | OUTPATIENT
Start: 2018-09-04 | End: 2018-09-04 | Stop reason: HOSPADM

## 2018-09-04 RX ORDER — LIDOCAINE HYDROCHLORIDE 20 MG/ML
JELLY TOPICAL AS NEEDED
Status: DISCONTINUED | OUTPATIENT
Start: 2018-09-04 | End: 2018-09-04 | Stop reason: HOSPADM

## 2018-09-04 RX ORDER — BISACODYL 10 MG
10 SUPPOSITORY, RECTAL RECTAL
Status: DISCONTINUED | OUTPATIENT
Start: 2018-09-04 | End: 2018-09-05

## 2018-09-04 RX ORDER — PHENAZOPYRIDINE HYDROCHLORIDE 100 MG/1
100 TABLET, FILM COATED ORAL 3 TIMES DAILY PRN
Status: DISCONTINUED | OUTPATIENT
Start: 2018-09-04 | End: 2018-09-05

## 2018-09-04 RX ORDER — DOCUSATE SODIUM 100 MG/1
100 CAPSULE, LIQUID FILLED ORAL 2 TIMES DAILY
Status: DISCONTINUED | OUTPATIENT
Start: 2018-09-04 | End: 2018-09-04

## 2018-09-04 RX ORDER — MIDAZOLAM HYDROCHLORIDE 1 MG/ML
1 INJECTION INTRAMUSCULAR; INTRAVENOUS EVERY 5 MIN PRN
Status: DISCONTINUED | OUTPATIENT
Start: 2018-09-04 | End: 2018-09-04 | Stop reason: HOSPADM

## 2018-09-04 RX ORDER — ONDANSETRON 4 MG/1
4 TABLET, FILM COATED ORAL EVERY 6 HOURS PRN
Status: DISCONTINUED | OUTPATIENT
Start: 2018-09-04 | End: 2018-09-05

## 2018-09-04 RX ORDER — LOVASTATIN 40 MG/1
40 TABLET ORAL NIGHTLY
COMMUNITY
End: 2020-11-12 | Stop reason: CLARIF

## 2018-09-04 RX ORDER — NALOXONE HYDROCHLORIDE 0.4 MG/ML
80 INJECTION, SOLUTION INTRAMUSCULAR; INTRAVENOUS; SUBCUTANEOUS AS NEEDED
Status: DISCONTINUED | OUTPATIENT
Start: 2018-09-04 | End: 2018-09-04 | Stop reason: HOSPADM

## 2018-09-04 RX ORDER — MORPHINE SULFATE 4 MG/ML
4 INJECTION, SOLUTION INTRAMUSCULAR; INTRAVENOUS EVERY 2 HOUR PRN
Status: DISCONTINUED | OUTPATIENT
Start: 2018-09-04 | End: 2018-09-05

## 2018-09-04 RX ORDER — SODIUM CHLORIDE 9 MG/ML
INJECTION, SOLUTION INTRAVENOUS CONTINUOUS
Status: DISCONTINUED | OUTPATIENT
Start: 2018-09-04 | End: 2018-09-05

## 2018-09-04 RX ORDER — SODIUM CHLORIDE, SODIUM LACTATE, POTASSIUM CHLORIDE, CALCIUM CHLORIDE 600; 310; 30; 20 MG/100ML; MG/100ML; MG/100ML; MG/100ML
INJECTION, SOLUTION INTRAVENOUS CONTINUOUS
Status: DISCONTINUED | OUTPATIENT
Start: 2018-09-04 | End: 2018-09-05

## 2018-09-04 RX ORDER — MORPHINE SULFATE 4 MG/ML
4 INJECTION, SOLUTION INTRAMUSCULAR; INTRAVENOUS EVERY 2 HOUR PRN
Status: DISCONTINUED | OUTPATIENT
Start: 2018-09-04 | End: 2018-09-04

## 2018-09-04 NOTE — H&P
[facility]  History and Physical     [facility] Patient Status:  Observation    1965 MRN [id number]   [facility] 5NW-A Attending Kristeen Hashimoto, MD   Hosp Day # 0 PCP Siena Sterling MD     Chief Complaint: Right Review of Systems:   A comprehensive 14 point review of systems was completed. Pertinent positives and negatives noted in the HPI.     Physical Exam:    BP (!) 153/96 (BP Location: Right arm)   Pulse 62   Temp 97.9 °F (36.6 °C) (Oral)   Resp 20   W on ARB  4. Hyperlipidemia-we will continue on statin therapy  5. GERD-we will continue on PPI  6. Obesity-is 33.77    Quality:  · DVT Prophylaxis: SCDs  · CODE status: Full  · Snell: None    Plan of care discussed with patient at bedside.     Ibeth Martins

## 2018-09-04 NOTE — ANESTHESIA POSTPROCEDURE EVALUATION
[facility]    Pranay Christensen Patient Status:  Observation   Age/Gender 50year old female MRN [id number]   [facility] SURGERY Attending Manjeet Ha MD   Hosp Day # 0 PCP Namita Mao MD       Anesthesia Post-op Note    Proced

## 2018-09-04 NOTE — PROGRESS NOTES
[facility] Dosing Service    Initial Pharmacokinetic Consult for Aminoglycoside Dosing      Pranay Christensen is a 50year old female who is being treated for pyelonephritis. Pharmacy has been asked to dose gentamicin by Dr. Rainer Farmer.     She has No Kno PharmD  9/4/2018  4:28 AM

## 2018-09-04 NOTE — ED NOTES
Report called to Christina REYNOLDS RN, room 509. Pt to transport to [facility] by private car w/family. Pt denies pain at time of dc.

## 2018-09-04 NOTE — PLAN OF CARE
NURSING ADMISSION NOTE      Patient admitted via wheelchair. Oriented to room. Safety precautions initiated. Bed in low position. Call light in reach. NPO except sips, IVF infusing. Assess/monitor PRN pain meds. Monitor output strain urine.  Labs

## 2018-09-04 NOTE — CONSULTS
[facility] Urology   Consultation Note    Sebas Coreas Patient Status:  Observation    1965 MRN [id number]   [facility] 5NW-A Attending Olga Hammonds MD   Hosp Day # 0 PCP Lucius Morales MD     Reason for Consultat breast CA   • Diabetes Brother       reports that she has never smoked. She has never used smokeless tobacco. She reports that she does not drink alcohol or use drugs.     Allergies:  No Known Allergies    Medications:    Current Facility-Administered Medic Location: Right arm)   Pulse 65   Temp 97.7 °F (36.5 °C) (Oral)   Resp 20   Ht 5' 2\" (1.575 m)   Wt 178 lb 11.2 oz (81.1 kg)   SpO2 97%   BMI 32.68 kg/m²   GENERAL: The patient is resting in bed in no acute distress. HEENT: Unremarkable. NECK: Supple. Moderate degenerative changes. PELVIC ORGANS:  Surgically absent uterus. Similar circumscribed low attenuation mass right adnexa/ovary. This appears unchanged measuring 5.0 x 4.7 cm. Hounsfield units recorded at 4.   LUNG BASES:  No consolidation or ple obstruction and pain, for that reason a ureteral stent is often left in place afterwards to alleviate those symptoms. Also, the stent allows easy access back into the kidney should there be residual stones to treat.  The stent must be removed within 3 month

## 2018-09-04 NOTE — ANESTHESIA PREPROCEDURE EVALUATION
PRE-OP EVALUATION    Patient Name: Neto Brown    Pre-op Diagnosis: UTI (urinary tract infection) [N39.0]  Right ureteral stone [N20.1]    Procedure(s):  CYSTOSCOPY, RIGHT URETERAL RETROGRADE PYELOGRAM, POSSIBLE RIGHT URETEROSCOPY, POSSIBLE RIGHT URET 1 g in sodium chloride 0.9 % 100 mL MBP/ADD-vantage 1 g Intravenous Q24H   [COMPLETED] Potassium Chloride ER (K-DUR M20) CR tab 40 mEq 40 mEq Oral Q4H   [COMPLETED] sodium chloride 0.9% IV bolus 1,000 mL 1,000 mL Intravenous Once   [COMPLETED] ondansetron 09/04/2018       Lab Results  Component Value Date    09/04/2018   K 3.5 (L) 09/04/2018    (H) 09/04/2018   CO2 26.0 09/04/2018   BUN 11 09/04/2018   CREATSERUM 0.59 09/04/2018   GLU 94 09/04/2018   CA 8.1 (L) 09/04/2018            Airway

## 2018-09-04 NOTE — ED PROVIDER NOTES
Patient Seen in: [facility] In [place]    History   Patient presents with:  Abdomen/Flank Pain (GI/)    Stated Complaint: Rt flank pain    HPI    Right flank pain for the past 3 days - hx of ureteral stones and last required surgica Course     Labs Reviewed   URINALYSIS WITH CULTURE REFLEX - Abnormal; Notable for the following:        Result Value    Urine Color Brown (*)     Clarity Urine Cloudy (*)     Bilirubin Urine Small (*)     Ketones Urine Trace (*)     Blood Urine Large (*) some improvement in pain. Admission disposition: 9/3/2018  9:29 PM          Review of the patient's chart, she follows with Dr. Mo Faust at [facility].   PCP is out of the [facility] as well    Pt desires admission to [facility]

## 2018-09-04 NOTE — PAYOR COMM NOTE
--------------  ADMISSION REVIEW     Payor: [address]  Subscriber #:  [id number]  Authorization Number: N/A    Admit date: 9/4/18  Admit time: 4125       Admitting Physician: Jamaal Knapp MD  Attending Physician:  Meredith Thomas MD  [facility] limits and her neck is supple her lungs are clear to auscultation her heart has regular rate and rhythm without murmurs rubs or gallops her abdomen she has tenderness to palpation over the right upper mid and lower abdomen as well as right CVA tenderness t [facility]   PCP is out of the [facility] as well    Pt desires admission to [address]  over [facility] urology) spoke with hospitalist and Dr Geetha Pinon - requested gentamicin addition to rocephin    Disposition and Plan   C Calculus of kidney    • Esophageal reflux    • Essential hypertension    • GERD     intermittent   • High blood pressure    • High cholesterol    • HYPERLIPIDEMIA    • Hyperlipidemia    • Kidney stone       Past Surgical History: Past Surgical History:  No Labs:  Recent Labs   Lab  09/03/18 1919   WBC  14.7*   HGB  12.4   MCV  88.4   PLT  343.0     GLU  102*   BUN  15   CREATSERUM  0.73   GFRAA  111   GFRNAA  96   CA  9.0   ALB  3.7   NA  140   K  3.5*   CL  106   CO2  24.0   ALKPHO  92   AST  20   AL [facility]) injection 4 mg     Date Action Dose Route User    9/3/2018 1920 Given 4 mg Intravenous Yelitza Christian, RN      Pantoprazole Sodium (PROTONIX) EC tab 40 mg     Date Action Dose Route User    9/4/2018 0547 Given 40 mg Oral Julia Mays, ALEX      0.9

## 2018-09-04 NOTE — ED PROVIDER NOTES
Patient Seen in: [facility] In [place]    History   Patient presents with:  Abdomen/Flank Pain (GI/)    Stated Complaint: Rt flank pain    HPI    Right flank pain with history of ureteral stones- worsening pain over 3 days with bloo

## 2018-09-04 NOTE — BRIEF OP NOTE
Pre-Operative Diagnosis: UTI (urinary tract infection) [N39.0], pyelonephritis  Right ureteral stone [N20.1], right hydronephrosis, right flank pain     Post-Operative Diagnosis: UTI (urinary tract infection) [N39.0], pyelonephritis  Right ureteral stone [

## 2018-09-05 VITALS
HEIGHT: 62 IN | DIASTOLIC BLOOD PRESSURE: 78 MMHG | HEART RATE: 70 BPM | BODY MASS INDEX: 32.88 KG/M2 | OXYGEN SATURATION: 96 % | WEIGHT: 178.69 LBS | TEMPERATURE: 98 F | RESPIRATION RATE: 20 BRPM | SYSTOLIC BLOOD PRESSURE: 142 MMHG

## 2018-09-05 PROCEDURE — 99239 HOSP IP/OBS DSCHRG MGMT >30: CPT | Performed by: HOSPITALIST

## 2018-09-05 RX ORDER — ACETAMINOPHEN AND CODEINE PHOSPHATE 300; 30 MG/1; MG/1
TABLET ORAL
Qty: 20 TABLET | Refills: 0 | Status: SHIPPED | OUTPATIENT
Start: 2018-09-05 | End: 2020-11-09

## 2018-09-05 RX ORDER — PHENAZOPYRIDINE HYDROCHLORIDE 100 MG/1
100 TABLET, FILM COATED ORAL 3 TIMES DAILY PRN
Qty: 15 TABLET | Refills: 1 | Status: SHIPPED | OUTPATIENT
Start: 2018-09-05 | End: 2020-11-09

## 2018-09-05 RX ORDER — CEPHALEXIN 500 MG/1
500 CAPSULE ORAL 3 TIMES DAILY
Qty: 42 CAPSULE | Refills: 0 | Status: SHIPPED | OUTPATIENT
Start: 2018-09-05 | End: 2018-10-04 | Stop reason: ALTCHOICE

## 2018-09-05 NOTE — PLAN OF CARE
Patient/Family Goals    • Patient/Family Long Term Goal Progressing    • Patient/Family Short Term Goal Progressing          Pt is alert and oriented x4. VSS. Maintaining 02 sats on room air. No c/o sob. Electrolytes replaced per order.  Pt NPO for cystosco

## 2018-09-05 NOTE — CM/SW NOTE
09/04/18 1900   CM/SW Screening   Referral Source Physician   Information Source (Patient)   Patient's Mental Status Alert;Oriented   Patient lives with Spouse   Patient Status Prior to Admission   Independent with ADLs and Mobility Yes     Late Entry:

## 2018-09-05 NOTE — PROGRESS NOTES
Multidisciplinary Discharge Rounds held 9/4/2018. Treatment team members present today include , , Charge Nurse,  Nurse, RT, PT and Pharmacy caring for [employer].      Other care providers present:    Patient Active Proble

## 2018-09-05 NOTE — PAYOR COMM NOTE
--------------  CONTINUED STAY REVIEW    Payor: [employer]  Subscriber #:  [id number]  Authorization Number: N/A    Admit date: 9/4/18  Admit time: 9514    Admitting Physician: Ryley Vargas MD  Attending Physician:  Annabel Anton MD  Pr User    9/4/2018 1847 Given 25 mcg Intravenous LishaiopKeven marie, RN      Gentamicin Sulfate (GARAMYCIN) 90 mg in sodium chloride 0.9% 100 mL IVPB     Date Action Dose Route User    9/5/2018 0542 New Bag 90 mg Intravenous Edna Awtood RN    9/4/2018

## 2018-09-05 NOTE — PROGRESS NOTES
[facility] Dosing Service    Follow-up Pharmacokinetic Consult for Aminoglycoside Dosing    Stanton Lyon is a 50year old female who is being treated for pyelonephritis. Patient is on day 2 of gentamicin 90 mg IV every 8 hours.   Goal peak/trough is: mcg/mL. 3.  Pharmacy will need BUN/SCr daily while on gentamicin to assess renal function. 4.  Pharmacy will follow and monitor renal function changes, toxicity and efficacy. Pharmacy will continue to follow her.   We appreciate the opportunity to

## 2018-09-05 NOTE — PLAN OF CARE
Assumed care at 2130  A/ox4, vsson room air. Pyridium given for burning with urination.  Urine strained, no stone seen at this time  PAIN - ADULT    • Verbalizes/displays adequate comfort level or patient's stated pain goal Progressing        Patient/Family

## 2018-09-05 NOTE — PROGRESS NOTES
Pharmacy Progress Note: Discharge Medication Counseling    Brigette Treadwell has been counseled on 6 medications. Of these medications, the following are new to the patient:    1. Cephalexin    2. Acetaminophen-Codeine     3.  Phenazopyridine     The indic

## 2018-09-05 NOTE — PLAN OF CARE
PAIN - ADULT    • Verbalizes/displays adequate comfort level or patient's stated pain goal Progressing        Patient/Family Goals    • Patient/Family Long Term Goal Progressing    • Patient/Family Short Term Goal Progressing          Multidisciplinary Dis

## 2018-09-05 NOTE — PROGRESS NOTES
[facility]  Urology Progress Note    Iraida Concepcion Patient Status:  Inpatient    1965 MRN [id number]   [facility] 5NW-A Attending Luz Maria Fitzgerald MD   Hosp Day # 0 PCP Alexys Mclain MD     Subjective:  [id number] [facility]

## 2018-09-05 NOTE — PROGRESS NOTES
Patient seen and examined    S- no complaints    General- NAD  Chest- CTAB  CVS- RRR  Abdo- soft, NT, BS+    Plan  Ok to [employer] home    Azeem Leon M.D.   [facility]

## 2018-09-06 NOTE — DISCHARGE SUMMARY
[facility]  DISCHARGE SUMMARY     Macrina Birmingham Patient Status:  Inpatient    1965 MRN [id number]   [facility] 5NW-A Attending No att. providers found   Hosp Day # 1 PCP Mesha Lewis MD     Date of Admission: 9/3/201 · none    Consultants:  • Urology    Discharge Medication List:     Discharge Medications      START taking these medications      Instructions Prescription details   Acetaminophen-Codeine #3 300-30 MG Tabs  Commonly known as:  TYLENOL #3      1-2 tablet rate and rhythm. No murmurs, rubs or gallops. Abdomen: Soft, nontender, nondistended. Positive bowel sounds. No rebound or guarding. Neurologic: No focal neurological deficits. Musculoskeletal: Moves all extremities. Extremities: No edema.   --------

## 2018-09-07 NOTE — OPERATIVE REPORT
[facility]    PATIENT'S NAME: Zainab Schaefer   ATTENDING PHYSICIAN: Bisi Leyva M.D. OPERATING PHYSICIAN: Tom Martinez M.D.    PATIENT ACCOUNT#:   [de-identified]    LOCATION:  [address] [place] [address]  MEDICAL RECORD #:   [id number]       DATE OF BIRTH:  13/ noting a bifurcation of the ureter in the upper third. The lower pole moiety had the hydroureteronephrosis down to an obstructing stone just above the Y communication.   An angled Glidewire was then used to advance into the lower pole moiety ureter success

## 2018-09-07 NOTE — PAYOR COMM NOTE
--------------  DISCHARGE REVIEW    Payor: [facility]  Subscriber #:  [id number]  Authorization Number: [id number]    Admit date: 9/3/2018  Admit time:  11:47 PM  Discharge Date: 9/5/2018  1:45 PM     Admitting Physician: Heidy Thompson MD pain, shortness of breath lightheadedness, or syncope. EKG, melena, hematuria. Brief Synopsis: Patient is a 59-year-old female admitted with right-sided nephrolithiasis.   She underwent a cystoscopy, right RG PEG, and insertion of a right urethral stent Caps  · Phenazopyridine HCl 100 MG Tabs     Please  your prescriptions at the location directed by your doctor or nurse    Bring a paper prescription for each of these medications  · Acetaminophen-Codeine #3 300-30 MG Tabs         [place]/

## 2018-09-17 PROCEDURE — 87077 CULTURE AEROBIC IDENTIFY: CPT | Performed by: UROLOGY

## 2018-09-17 PROCEDURE — 87186 SC STD MICRODIL/AGAR DIL: CPT | Performed by: UROLOGY

## 2018-09-17 PROCEDURE — 81015 MICROSCOPIC EXAM OF URINE: CPT | Performed by: UROLOGY

## 2018-09-17 PROCEDURE — 87086 URINE CULTURE/COLONY COUNT: CPT | Performed by: UROLOGY

## 2018-09-27 PROCEDURE — 87086 URINE CULTURE/COLONY COUNT: CPT | Performed by: UROLOGY

## 2018-09-27 PROCEDURE — 81015 MICROSCOPIC EXAM OF URINE: CPT | Performed by: UROLOGY

## 2018-10-01 ENCOUNTER — CHARTING TRANS (OUTPATIENT)
Dept: OTHER | Age: 53
End: 2018-10-01

## 2018-10-03 ENCOUNTER — LAB SERVICES (OUTPATIENT)
Dept: OTHER | Age: 53
End: 2018-10-03

## 2018-10-03 ENCOUNTER — CHARTING TRANS (OUTPATIENT)
Dept: OTHER | Age: 53
End: 2018-10-03

## 2018-10-03 ENCOUNTER — IMAGING SERVICES (OUTPATIENT)
Dept: OTHER | Age: 53
End: 2018-10-03

## 2018-10-03 LAB
BUN SERPL-MCNC: 17 MG/DL (ref 7–20)
CALCIUM SERPL-MCNC: 9.6 MG/DL (ref 8.6–10.6)
CHLORIDE SERPL-SCNC: 106 MMOL/L (ref 96–107)
CO2 SERPL-SCNC: 25 MMOL/L (ref 22–32)
CREAT SERPL-MCNC: 0.7 MG/DL (ref 0.5–1.4)
DIFFERENTIAL TYPE: ABNORMAL
EST. AVERAGE GLUCOSE BLD GHB EST-MCNC: 115 MG/DL (ref 0–154)
GFR SERPL CREATININE-BSD FRML MDRD: >60 ML/MIN/{1.73M2}
GFR SERPL CREATININE-BSD FRML MDRD: >60 ML/MIN/{1.73M2}
GLUCOSE SERPL-MCNC: 151 MG/DL (ref 70–200)
HBA1C BLD-MCNC: 5.6 % (ref 4.2–6)
HEMATOCRIT: 39.8 % (ref 34–45)
HEMOGLOBIN: 12.8 G/DL (ref 11.2–15.7)
LYMPH PERCENT: 28.6 % (ref 20.5–51.1)
LYMPHOCYTE ABSOLUTE #: 3.8 10*3/UL (ref 1.2–3.4)
MEAN CORPUSCULAR HGB CONCENTRATION: 32.2 % (ref 32–36)
MEAN CORPUSCULAR HGB: 28.5 PG (ref 27–34)
MEAN CORPUSCULAR VOLUME: 88.6 FL (ref 79–95)
MEAN PLATELET VOLUME: 8.4 FL (ref 8.6–12.4)
MIXED %: 6.1 % (ref 4.3–12.9)
MIXED ABSOLUTE #: 0.8 10*3/UL (ref 0.2–0.9)
NEUTROPHIL ABSOLUTE #: 8.7 10*3/UL (ref 1.4–6.5)
NEUTROPHIL PERCENT: 65.3 % (ref 34–73.5)
PLATELET COUNT: 375 10*3/UL (ref 150–400)
POTASSIUM SERPL-SCNC: 4.6 MMOL/L (ref 3.5–5.3)
RED BLOOD CELL COUNT: 4.49 10*6/UL (ref 3.7–5.2)
RED CELL DISTRIBUTION WIDTH: 13.4 % (ref 11.3–14.8)
SODIUM SERPL-SCNC: 141 MMOL/L (ref 136–146)
WHITE BLOOD CELL COUNT: 13.3 10*3/UL (ref 4–10)

## 2018-10-04 ENCOUNTER — CHARTING TRANS (OUTPATIENT)
Dept: OTHER | Age: 53
End: 2018-10-04

## 2018-10-05 ENCOUNTER — ANESTHESIA (OUTPATIENT)
Dept: SURGERY | Facility: HOSPITAL | Age: 53
End: 2018-10-05
Payer: COMMERCIAL

## 2018-10-05 ENCOUNTER — ANESTHESIA EVENT (OUTPATIENT)
Dept: SURGERY | Facility: HOSPITAL | Age: 53
End: 2018-10-05
Payer: COMMERCIAL

## 2018-10-05 ENCOUNTER — HOSPITAL ENCOUNTER (OUTPATIENT)
Facility: HOSPITAL | Age: 53
Setting detail: HOSPITAL OUTPATIENT SURGERY
Discharge: HOME | End: 2018-10-05
Attending: UROLOGY | Admitting: UROLOGY
Payer: COMMERCIAL

## 2018-10-05 ENCOUNTER — APPOINTMENT (OUTPATIENT)
Dept: GENERAL RADIOLOGY | Facility: HOSPITAL | Age: 53
End: 2018-10-05
Attending: UROLOGY
Payer: COMMERCIAL

## 2018-10-05 VITALS
TEMPERATURE: 99 F | WEIGHT: 176.38 LBS | BODY MASS INDEX: 32.46 KG/M2 | OXYGEN SATURATION: 100 % | RESPIRATION RATE: 18 BRPM | SYSTOLIC BLOOD PRESSURE: 110 MMHG | HEART RATE: 74 BPM | DIASTOLIC BLOOD PRESSURE: 73 MMHG | HEIGHT: 62 IN

## 2018-10-05 DIAGNOSIS — N20.1 CALCULUS OF URETER: ICD-10-CM

## 2018-10-05 PROCEDURE — 76000 FLUOROSCOPY <1 HR PHYS/QHP: CPT | Performed by: UROLOGY

## 2018-10-05 PROCEDURE — 0TF68ZZ FRAGMENTATION IN RIGHT URETER, VIA NATURAL OR ARTIFICIAL OPENING ENDOSCOPIC: ICD-10-PCS | Performed by: UROLOGY

## 2018-10-05 PROCEDURE — 0T768DZ DILATION OF RIGHT URETER WITH INTRALUMINAL DEVICE, VIA NATURAL OR ARTIFICIAL OPENING ENDOSCOPIC: ICD-10-PCS | Performed by: UROLOGY

## 2018-10-05 DEVICE — STENT URET 6F 24CM ULSMTH: Type: IMPLANTABLE DEVICE | Site: URETER | Status: FUNCTIONAL

## 2018-10-05 RX ORDER — ONDANSETRON 2 MG/ML
4 INJECTION INTRAMUSCULAR; INTRAVENOUS AS NEEDED
Status: DISCONTINUED | OUTPATIENT
Start: 2018-10-05 | End: 2018-10-05

## 2018-10-05 RX ORDER — SODIUM CHLORIDE, SODIUM LACTATE, POTASSIUM CHLORIDE, CALCIUM CHLORIDE 600; 310; 30; 20 MG/100ML; MG/100ML; MG/100ML; MG/100ML
INJECTION, SOLUTION INTRAVENOUS CONTINUOUS
Status: DISCONTINUED | OUTPATIENT
Start: 2018-10-05 | End: 2018-10-05

## 2018-10-05 RX ORDER — LIDOCAINE HYDROCHLORIDE 20 MG/ML
JELLY TOPICAL AS NEEDED
Status: DISCONTINUED | OUTPATIENT
Start: 2018-10-05 | End: 2018-10-05 | Stop reason: HOSPADM

## 2018-10-05 RX ORDER — METHENAMINE, SODIUM PHOSPHATE, MONOBASIC, MONOHYDRATE, PHENYL SALICYLATE, METHYLENE BLUE, AND HYOSCYAMINE SULFATE 120; 40.8; 36; 10; .12 MG/1; MG/1; MG/1; MG/1; MG/1
1 CAPSULE ORAL 3 TIMES DAILY
Qty: 12 CAPSULE | Refills: 1 | Status: SHIPPED | OUTPATIENT
Start: 2018-10-05 | End: 2018-10-10

## 2018-10-05 RX ORDER — MORPHINE SULFATE 4 MG/ML
2 INJECTION, SOLUTION INTRAMUSCULAR; INTRAVENOUS EVERY 5 MIN PRN
Status: DISCONTINUED | OUTPATIENT
Start: 2018-10-05 | End: 2018-10-05

## 2018-10-05 RX ORDER — HYDROCODONE BITARTRATE AND ACETAMINOPHEN 5; 325 MG/1; MG/1
1 TABLET ORAL AS NEEDED
Status: DISCONTINUED | OUTPATIENT
Start: 2018-10-05 | End: 2018-10-05

## 2018-10-05 RX ORDER — CEFAZOLIN SODIUM/WATER 2 G/20 ML
2 SYRINGE (ML) INTRAVENOUS ONCE
Status: COMPLETED | OUTPATIENT
Start: 2018-10-05 | End: 2018-10-05

## 2018-10-05 RX ORDER — TAMSULOSIN HYDROCHLORIDE 0.4 MG/1
0.4 CAPSULE ORAL EVERY EVENING
Qty: 20 CAPSULE | Refills: 1 | Status: SHIPPED | OUTPATIENT
Start: 2018-10-05 | End: 2018-10-25

## 2018-10-05 RX ORDER — HYDROCODONE BITARTRATE AND ACETAMINOPHEN 5; 325 MG/1; MG/1
2 TABLET ORAL AS NEEDED
Status: DISCONTINUED | OUTPATIENT
Start: 2018-10-05 | End: 2018-10-05

## 2018-10-05 RX ORDER — NALOXONE HYDROCHLORIDE 0.4 MG/ML
80 INJECTION, SOLUTION INTRAMUSCULAR; INTRAVENOUS; SUBCUTANEOUS AS NEEDED
Status: DISCONTINUED | OUTPATIENT
Start: 2018-10-05 | End: 2018-10-05

## 2018-10-05 RX ORDER — ACETAMINOPHEN 500 MG
1000 TABLET ORAL ONCE
Status: DISCONTINUED | OUTPATIENT
Start: 2018-10-05 | End: 2018-10-05 | Stop reason: HOSPADM

## 2018-10-05 RX ORDER — ACETAMINOPHEN 500 MG
1000 TABLET ORAL EVERY 6 HOURS PRN
COMMUNITY

## 2018-10-05 RX ORDER — METOCLOPRAMIDE HYDROCHLORIDE 5 MG/ML
10 INJECTION INTRAMUSCULAR; INTRAVENOUS AS NEEDED
Status: DISCONTINUED | OUTPATIENT
Start: 2018-10-05 | End: 2018-10-05

## 2018-10-05 NOTE — BRIEF OP NOTE
Pre-Operative Diagnosis:  RIGHT KIDNEY CALCULUS     Post-Operative Diagnosis: RIGHT KIDNEY CALCULUS      Procedure Performed:   Procedure(s):  CYSTOSCOPY, RIGHT URETEROSCOPY with LASER LITHOTRIPSY of kidney stone, RIGHT URETERAL STENT EXCHANGE, fluoroscopy

## 2018-10-05 NOTE — H&P
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 Any changes noted above.     PLAN: CYSTO, RIGHT URETEROSCOPY WITH LASER LITHOTRIPSY OF RENAL STONE AND STENT REPLACEMENT    Barry Sandoval  10/5/2018  1:20 PM

## 2018-10-05 NOTE — ANESTHESIA PREPROCEDURE EVALUATION
PRE-OP EVALUATION    Patient Name: Elidia Rdz    Pre-op Diagnosis: Calculus of ureter [N20.1]    Procedure(s):  CYSTOSCOPY, RIGHT URETEROSCOPY, POSSIBLE STONE EXTRACTION, POSSIBLE LASER LITHOTRIPSY, POSSIBLE RIGHT URETERAL STENT PLACEMENT, POSSIBLE S Neuro/Psych    Negative neuro/psych ROS. Past Surgical History:  No date: CHOLECYSTECTOMY  9/4/2018: CYSTOSCOPY URETEROSCOPY;  Right      Comment:  Performed by Angela Gaona MD at [address]  No date: HYSTERECTOMY  No

## 2018-10-05 NOTE — ANESTHESIA POSTPROCEDURE EVALUATION
[facility]    Perlita Mccallum Patient Status:  Hospital Outpatient Surgery   Age/Gender 50year old female MRN [id number]   [facility] SURGERY Attending Therese Hernandez MD   Hosp Day # 0 PCP Cecilia Adhikari MD       Anesthesia Post-
Po Schmid

## 2018-10-09 NOTE — OPERATIVE REPORT
[facility]    PATIENT'S NAME: Billy Perez   ATTENDING PHYSICIAN: Ana Laura Zamorano M.D. OPERATING PHYSICIAN: Ana Laura Zamorano M.D.    PATIENT ACCOUNT#:   [de-identified]    LOCATION:  PREOPASCC  PRE ASCC 2 EDWP 10  MEDICAL RECORD #:   [id number]       DATE [facility] ureteroscope was then advanced over the Glidewire, through the urethra, into the bladder, and into the right collecting system with a duplication seen with a high Y communication. The stone was seen at the right UPJ region.   The Glidewire was then withdra

## 2018-10-25 ENCOUNTER — IMAGING SERVICES (OUTPATIENT)
Dept: OTHER | Age: 53
End: 2018-10-25

## 2018-10-25 ENCOUNTER — CHARTING TRANS (OUTPATIENT)
Dept: OTHER | Age: 53
End: 2018-10-25

## 2018-10-25 ENCOUNTER — LAB SERVICES (OUTPATIENT)
Dept: OTHER | Age: 53
End: 2018-10-25

## 2018-10-25 LAB — D DIMER PPP IA.DDU-MCNC: 233 NG/ML (ref 0–400)

## 2018-10-25 ASSESSMENT — PAIN SCALES - GENERAL: PAINLEVEL_OUTOF10: 6

## 2018-11-23 ENCOUNTER — IMAGING SERVICES (OUTPATIENT)
Dept: OTHER | Age: 53
End: 2018-11-23

## 2018-11-28 ENCOUNTER — CHARTING TRANS (OUTPATIENT)
Dept: OTHER | Age: 53
End: 2018-11-28

## 2018-11-28 VITALS — HEIGHT: 62 IN | WEIGHT: 182 LBS | BODY MASS INDEX: 33.49 KG/M2 | TEMPERATURE: 99.3 F

## 2018-11-28 VITALS
BODY MASS INDEX: 32.94 KG/M2 | HEIGHT: 62 IN | HEIGHT: 62 IN | TEMPERATURE: 98.8 F | WEIGHT: 182 LBS | HEART RATE: 72 BPM | SYSTOLIC BLOOD PRESSURE: 122 MMHG | DIASTOLIC BLOOD PRESSURE: 82 MMHG | WEIGHT: 179 LBS | BODY MASS INDEX: 33.49 KG/M2

## 2018-11-28 VITALS
TEMPERATURE: 98.6 F | HEART RATE: 79 BPM | SYSTOLIC BLOOD PRESSURE: 110 MMHG | OXYGEN SATURATION: 99 % | DIASTOLIC BLOOD PRESSURE: 88 MMHG

## 2018-11-28 VITALS — WEIGHT: 182 LBS | SYSTOLIC BLOOD PRESSURE: 118 MMHG | HEART RATE: 66 BPM | DIASTOLIC BLOOD PRESSURE: 74 MMHG

## 2018-11-28 VITALS
DIASTOLIC BLOOD PRESSURE: 84 MMHG | OXYGEN SATURATION: 98 % | SYSTOLIC BLOOD PRESSURE: 128 MMHG | HEIGHT: 62 IN | HEART RATE: 82 BPM | BODY MASS INDEX: 33.68 KG/M2 | WEIGHT: 183 LBS

## 2018-12-04 VITALS
TEMPERATURE: 99.5 F | WEIGHT: 177 LBS | SYSTOLIC BLOOD PRESSURE: 152 MMHG | BODY MASS INDEX: 32.37 KG/M2 | DIASTOLIC BLOOD PRESSURE: 100 MMHG

## 2018-12-17 ENCOUNTER — TELEPHONE (OUTPATIENT)
Dept: SURGERY | Age: 53
End: 2018-12-17

## 2018-12-17 ENCOUNTER — OFFICE VISIT (OUTPATIENT)
Dept: SURGERY | Age: 53
End: 2018-12-17

## 2018-12-17 VITALS — BODY MASS INDEX: 32.94 KG/M2 | TEMPERATURE: 98 F | WEIGHT: 179 LBS | HEIGHT: 62 IN

## 2018-12-17 DIAGNOSIS — R22.2 MASS ON BACK: Primary | ICD-10-CM

## 2018-12-17 PROCEDURE — 99243 OFF/OP CNSLTJ NEW/EST LOW 30: CPT | Performed by: SURGERY

## 2018-12-17 RX ORDER — TELMISARTAN 80 MG/1
80 TABLET ORAL
COMMUNITY
Start: 2018-11-28 | End: 2019-06-07 | Stop reason: SDUPTHER

## 2018-12-17 RX ORDER — LOVASTATIN 40 MG/1
40 TABLET ORAL
COMMUNITY
Start: 2018-07-17 | End: 2019-06-07 | Stop reason: SDUPTHER

## 2018-12-17 RX ORDER — ESCITALOPRAM OXALATE 10 MG/1
10 TABLET ORAL
COMMUNITY
Start: 2018-11-28 | End: 2019-06-07 | Stop reason: ALTCHOICE

## 2018-12-17 RX ORDER — TAMSULOSIN HYDROCHLORIDE 0.4 MG/1
CAPSULE ORAL
COMMUNITY
Start: 2017-07-12 | End: 2018-12-17 | Stop reason: CLARIF

## 2018-12-17 SDOH — HEALTH STABILITY: MENTAL HEALTH: HOW OFTEN DO YOU HAVE A DRINK CONTAINING ALCOHOL?: NEVER

## 2018-12-19 PROBLEM — E87.6 HYPOKALEMIA: Status: ACTIVE | Noted: 2018-09-03

## 2018-12-19 PROBLEM — R79.89 AZOTEMIA: Status: ACTIVE | Noted: 2018-09-03

## 2018-12-19 PROBLEM — E78.00 PURE HYPERCHOLESTEROLEMIA: Status: ACTIVE | Noted: 2018-09-04

## 2018-12-19 PROBLEM — N13.2 URETERAL STONE WITH HYDRONEPHROSIS: Status: ACTIVE | Noted: 2018-09-03

## 2018-12-19 PROBLEM — N12 PYELONEPHRITIS: Status: ACTIVE | Noted: 2018-09-04

## 2018-12-19 PROBLEM — R73.9 HYPERGLYCEMIA: Status: ACTIVE | Noted: 2018-09-03

## 2019-03-05 VITALS
SYSTOLIC BLOOD PRESSURE: 122 MMHG | HEART RATE: 86 BPM | WEIGHT: 177 LBS | DIASTOLIC BLOOD PRESSURE: 68 MMHG | OXYGEN SATURATION: 99 % | BODY MASS INDEX: 32.37 KG/M2

## 2019-03-05 VITALS
TEMPERATURE: 98.5 F | DIASTOLIC BLOOD PRESSURE: 84 MMHG | HEART RATE: 84 BPM | SYSTOLIC BLOOD PRESSURE: 130 MMHG | RESPIRATION RATE: 18 BRPM | OXYGEN SATURATION: 97 %

## 2019-03-06 VITALS
HEART RATE: 82 BPM | OXYGEN SATURATION: 98 % | WEIGHT: 187 LBS | HEIGHT: 62 IN | BODY MASS INDEX: 34.41 KG/M2 | SYSTOLIC BLOOD PRESSURE: 122 MMHG | DIASTOLIC BLOOD PRESSURE: 60 MMHG

## 2019-06-07 ENCOUNTER — OFFICE VISIT (OUTPATIENT)
Dept: INTERNAL MEDICINE | Age: 54
End: 2019-06-07

## 2019-06-07 VITALS
HEART RATE: 62 BPM | BODY MASS INDEX: 31.83 KG/M2 | DIASTOLIC BLOOD PRESSURE: 88 MMHG | OXYGEN SATURATION: 98 % | WEIGHT: 173 LBS | SYSTOLIC BLOOD PRESSURE: 124 MMHG | HEIGHT: 62 IN

## 2019-06-07 DIAGNOSIS — J45.20 MILD INTERMITTENT ASTHMA WITHOUT COMPLICATION: ICD-10-CM

## 2019-06-07 DIAGNOSIS — B35.4 TINEA CORPORIS: ICD-10-CM

## 2019-06-07 DIAGNOSIS — E11.8 TYPE 2 DIABETES MELLITUS WITH COMPLICATION, WITHOUT LONG-TERM CURRENT USE OF INSULIN (CMD): ICD-10-CM

## 2019-06-07 DIAGNOSIS — F34.1 DYSTHYMIA: ICD-10-CM

## 2019-06-07 DIAGNOSIS — Z23 NEED FOR TDAP VACCINATION: ICD-10-CM

## 2019-06-07 DIAGNOSIS — I10 ESSENTIAL HYPERTENSION: Primary | ICD-10-CM

## 2019-06-07 DIAGNOSIS — E78.49 OTHER HYPERLIPIDEMIA: ICD-10-CM

## 2019-06-07 PROCEDURE — 90471 IMMUNIZATION ADMIN: CPT

## 2019-06-07 PROCEDURE — 90715 TDAP VACCINE 7 YRS/> IM: CPT

## 2019-06-07 PROCEDURE — 3074F SYST BP LT 130 MM HG: CPT | Performed by: FAMILY MEDICINE

## 2019-06-07 PROCEDURE — 99214 OFFICE O/P EST MOD 30 MIN: CPT | Performed by: FAMILY MEDICINE

## 2019-06-07 PROCEDURE — 3079F DIAST BP 80-89 MM HG: CPT | Performed by: FAMILY MEDICINE

## 2019-06-07 RX ORDER — PAROXETINE HYDROCHLORIDE 20 MG/1
20 TABLET, FILM COATED ORAL EVERY MORNING
Qty: 90 TABLET | Refills: 1 | Status: SHIPPED | OUTPATIENT
Start: 2019-06-07 | End: 2019-12-06 | Stop reason: SDUPTHER

## 2019-06-07 RX ORDER — TELMISARTAN 80 MG/1
80 TABLET ORAL DAILY
Qty: 90 TABLET | Refills: 1 | Status: SHIPPED | OUTPATIENT
Start: 2019-06-07 | End: 2019-12-06 | Stop reason: SDUPTHER

## 2019-06-07 RX ORDER — ALBUTEROL SULFATE 90 UG/1
2 AEROSOL, METERED RESPIRATORY (INHALATION) EVERY 4 HOURS PRN
Qty: 1 INHALER | Refills: 0 | Status: SHIPPED | OUTPATIENT
Start: 2019-06-07 | End: 2019-12-06 | Stop reason: SDUPTHER

## 2019-06-07 RX ORDER — PAROXETINE HYDROCHLORIDE 20 MG/1
20 TABLET, FILM COATED ORAL EVERY MORNING
COMMUNITY
End: 2019-06-07 | Stop reason: SDUPTHER

## 2019-06-07 RX ORDER — ESCITALOPRAM OXALATE 10 MG/1
10 TABLET ORAL DAILY
Qty: 90 TABLET | Refills: 1 | Status: CANCELLED | OUTPATIENT
Start: 2019-06-07

## 2019-06-07 RX ORDER — LOVASTATIN 40 MG/1
40 TABLET ORAL NIGHTLY
Qty: 90 TABLET | Refills: 1 | Status: SHIPPED | OUTPATIENT
Start: 2019-06-07 | End: 2019-11-21 | Stop reason: SDUPTHER

## 2019-07-09 ENCOUNTER — IMAGING SERVICES (OUTPATIENT)
Dept: OTHER | Age: 54
End: 2019-07-09

## 2019-07-10 ENCOUNTER — LAB SERVICES (OUTPATIENT)
Dept: LAB | Age: 54
End: 2019-07-10

## 2019-07-10 ENCOUNTER — OFFICE VISIT (OUTPATIENT)
Dept: DERMATOLOGY | Age: 54
End: 2019-07-10

## 2019-07-10 DIAGNOSIS — L65.9 ALOPECIA: Primary | ICD-10-CM

## 2019-07-10 DIAGNOSIS — L65.9 ALOPECIA: ICD-10-CM

## 2019-07-10 LAB
25(OH)D3 SERPL-MCNC: 20 NG/ML (ref 30–100)
TSH SERPL DL<=0.05 MIU/L-ACNC: 1.84 M[IU]/L (ref 0.3–4.82)

## 2019-07-10 PROCEDURE — 36415 COLL VENOUS BLD VENIPUNCTURE: CPT | Performed by: DERMATOLOGY

## 2019-07-10 PROCEDURE — 84630 ASSAY OF ZINC: CPT | Performed by: DERMATOLOGY

## 2019-07-10 PROCEDURE — 84443 ASSAY THYROID STIM HORMONE: CPT | Performed by: DERMATOLOGY

## 2019-07-10 PROCEDURE — 82306 VITAMIN D 25 HYDROXY: CPT | Performed by: DERMATOLOGY

## 2019-07-10 PROCEDURE — 99202 OFFICE O/P NEW SF 15 MIN: CPT | Performed by: DERMATOLOGY

## 2019-07-10 RX ORDER — KETOCONAZOLE 20 MG/ML
SHAMPOO TOPICAL
Qty: 120 ML | Refills: 3 | Status: SHIPPED | OUTPATIENT
Start: 2019-07-10 | End: 2019-12-06 | Stop reason: ALTCHOICE

## 2019-07-12 LAB — ZINC RBC-MCNC: 79 MCG/DL (ref 70–120)

## 2019-07-15 ENCOUNTER — IMAGING SERVICES (OUTPATIENT)
Dept: OTHER | Age: 54
End: 2019-07-15

## 2019-08-19 ENCOUNTER — WALK IN (OUTPATIENT)
Dept: URGENT CARE | Age: 54
End: 2019-08-19

## 2019-08-19 VITALS
OXYGEN SATURATION: 98 % | DIASTOLIC BLOOD PRESSURE: 82 MMHG | TEMPERATURE: 98.2 F | SYSTOLIC BLOOD PRESSURE: 134 MMHG | RESPIRATION RATE: 16 BRPM | HEART RATE: 67 BPM

## 2019-08-19 DIAGNOSIS — R30.0 DYSURIA: Primary | ICD-10-CM

## 2019-08-19 DIAGNOSIS — N39.0 URINARY TRACT INFECTION WITHOUT HEMATURIA, SITE UNSPECIFIED: ICD-10-CM

## 2019-08-19 LAB
BILIRUBIN URINE: NEGATIVE
BLOOD URINE: ABNORMAL
CLARITY: ABNORMAL
COLOR: YELLOW
GLUCOSE QUALITATIVE U: NEGATIVE
KETONES, URINE: NEGATIVE
LEUKOCYTE ESTERASE URINE: ABNORMAL
MUCOUS: ABNORMAL
NITRITE URINE: POSITIVE
PH URINE: 6 (ref 5–7)
RED BLOOD CELLS URINE: ABNORMAL (ref 0–3)
SPECIFIC GRAVITY URINE: 1.02 (ref 1–1.03)
SQUAMOUS EPITHELIAL CELLS: ABNORMAL
URINE PROTEIN, QUAL (DIPSTICK): NEGATIVE
UROBILINOGEN URINE: 2
WHITE BLOOD CELLS URINE: ABNORMAL (ref 0–5)

## 2019-08-19 PROCEDURE — 87086 URINE CULTURE/COLONY COUNT: CPT | Performed by: INTERNAL MEDICINE

## 2019-08-19 PROCEDURE — 81003 URINALYSIS AUTO W/O SCOPE: CPT | Performed by: INTERNAL MEDICINE

## 2019-08-19 PROCEDURE — 87088 URINE BACTERIA CULTURE: CPT | Performed by: INTERNAL MEDICINE

## 2019-08-19 PROCEDURE — 99214 OFFICE O/P EST MOD 30 MIN: CPT | Performed by: INTERNAL MEDICINE

## 2019-08-19 PROCEDURE — 87186 SC STD MICRODIL/AGAR DIL: CPT | Performed by: INTERNAL MEDICINE

## 2019-08-19 RX ORDER — NITROFURANTOIN 25; 75 MG/1; MG/1
100 CAPSULE ORAL 2 TIMES DAILY
Qty: 14 CAPSULE | Refills: 0 | Status: SHIPPED | OUTPATIENT
Start: 2019-08-19 | End: 2019-08-26

## 2019-08-21 LAB — FINAL REPORT: ABNORMAL

## 2019-08-21 RX ORDER — CEFUROXIME AXETIL 500 MG/1
500 TABLET ORAL 2 TIMES DAILY
Qty: 14 TABLET | Refills: 0 | Status: SHIPPED | OUTPATIENT
Start: 2019-08-21 | End: 2019-08-28

## 2019-09-17 ENCOUNTER — APPOINTMENT (OUTPATIENT)
Dept: DERMATOLOGY | Age: 54
End: 2019-09-17

## 2019-11-01 ENCOUNTER — LAB SERVICES (OUTPATIENT)
Dept: LAB | Age: 54
End: 2019-11-01

## 2019-11-01 DIAGNOSIS — E78.49 OTHER HYPERLIPIDEMIA: ICD-10-CM

## 2019-11-01 DIAGNOSIS — E11.8 TYPE 2 DIABETES MELLITUS WITH COMPLICATION, WITHOUT LONG-TERM CURRENT USE OF INSULIN (CMD): ICD-10-CM

## 2019-11-01 LAB
ALBUMIN SERPL-MCNC: 4.5 G/DL (ref 3.6–5.1)
ALP SERPL-CCNC: 103 U/L (ref 45–130)
ALT SERPL W/O P-5'-P-CCNC: 18 U/L (ref 4–38)
AST SERPL-CCNC: 22 U/L (ref 14–43)
BILIRUB SERPL-MCNC: 1 MG/DL (ref 0–1.3)
BUN SERPL-MCNC: 12 MG/DL (ref 7–20)
CALCIUM SERPL-MCNC: 10.1 MG/DL (ref 8.6–10.6)
CHLORIDE SERPL-SCNC: 105 MMOL/L (ref 96–107)
CHOLEST SERPL-MCNC: 209 MG/DL (ref 140–200)
CO2 SERPL-SCNC: 29 MMOL/L (ref 22–32)
CREAT SERPL-MCNC: 0.7 MG/DL (ref 0.5–1.4)
EST. AVERAGE GLUCOSE BLD GHB EST-MCNC: 119 MG/DL (ref 0–154)
GFR SERPL CREATININE-BSD FRML MDRD: >60 ML/MIN/{1.73M2}
GFR SERPL CREATININE-BSD FRML MDRD: >60 ML/MIN/{1.73M2}
GLUCOSE P FAST SERPL-MCNC: 114 MG/DL (ref 60–100)
HBA1C MFR BLD: 5.8 % (ref 4.2–6)
HDLC SERPL-MCNC: 42 MG/DL
LDLC SERPL CALC-MCNC: 121 MG/DL (ref 30–100)
POTASSIUM SERPL-SCNC: 4.1 MMOL/L (ref 3.5–5.3)
PROT SERPL-MCNC: 8.1 G/DL (ref 6.4–8.5)
SODIUM SERPL-SCNC: 144 MMOL/L (ref 136–146)
TRIGL SERPL-MCNC: 229 MG/DL (ref 0–200)

## 2019-11-01 PROCEDURE — 80053 COMPREHEN METABOLIC PANEL: CPT | Performed by: FAMILY MEDICINE

## 2019-11-01 PROCEDURE — 80061 LIPID PANEL: CPT | Performed by: FAMILY MEDICINE

## 2019-11-01 PROCEDURE — 36415 COLL VENOUS BLD VENIPUNCTURE: CPT | Performed by: FAMILY MEDICINE

## 2019-11-01 PROCEDURE — 83036 HEMOGLOBIN GLYCOSYLATED A1C: CPT | Performed by: FAMILY MEDICINE

## 2019-11-12 ENCOUNTER — APPOINTMENT (OUTPATIENT)
Dept: INTERNAL MEDICINE | Age: 54
End: 2019-11-12

## 2019-11-21 DIAGNOSIS — E78.49 OTHER HYPERLIPIDEMIA: ICD-10-CM

## 2019-11-21 RX ORDER — LOVASTATIN 40 MG/1
40 TABLET ORAL NIGHTLY
Qty: 90 TABLET | Refills: 0 | Status: SHIPPED | OUTPATIENT
Start: 2019-11-21 | End: 2019-12-06 | Stop reason: ALTCHOICE

## 2019-12-03 ENCOUNTER — TELEPHONE (OUTPATIENT)
Dept: INTERNAL MEDICINE | Age: 54
End: 2019-12-03

## 2019-12-06 ENCOUNTER — OFFICE VISIT (OUTPATIENT)
Dept: INTERNAL MEDICINE | Age: 54
End: 2019-12-06

## 2019-12-06 VITALS
WEIGHT: 175 LBS | SYSTOLIC BLOOD PRESSURE: 120 MMHG | HEIGHT: 62 IN | BODY MASS INDEX: 32.2 KG/M2 | DIASTOLIC BLOOD PRESSURE: 78 MMHG | HEART RATE: 81 BPM

## 2019-12-06 DIAGNOSIS — J45.901 EXACERBATION OF ASTHMA, UNSPECIFIED ASTHMA SEVERITY, UNSPECIFIED WHETHER PERSISTENT: ICD-10-CM

## 2019-12-06 DIAGNOSIS — L30.4 INTERTRIGO: ICD-10-CM

## 2019-12-06 DIAGNOSIS — J45.20 MILD INTERMITTENT ASTHMA WITHOUT COMPLICATION: ICD-10-CM

## 2019-12-06 DIAGNOSIS — F34.1 DYSTHYMIA: ICD-10-CM

## 2019-12-06 DIAGNOSIS — I10 ESSENTIAL HYPERTENSION: ICD-10-CM

## 2019-12-06 DIAGNOSIS — E78.2 MIXED HYPERLIPIDEMIA: Primary | ICD-10-CM

## 2019-12-06 PROCEDURE — 3078F DIAST BP <80 MM HG: CPT | Performed by: FAMILY MEDICINE

## 2019-12-06 PROCEDURE — 3074F SYST BP LT 130 MM HG: CPT | Performed by: FAMILY MEDICINE

## 2019-12-06 PROCEDURE — 99214 OFFICE O/P EST MOD 30 MIN: CPT | Performed by: FAMILY MEDICINE

## 2019-12-06 RX ORDER — PAROXETINE HYDROCHLORIDE 20 MG/1
20 TABLET, FILM COATED ORAL EVERY MORNING
Qty: 90 TABLET | Refills: 1 | Status: SHIPPED | OUTPATIENT
Start: 2019-12-06 | End: 2020-07-01

## 2019-12-06 RX ORDER — ATORVASTATIN CALCIUM 20 MG/1
20 TABLET, FILM COATED ORAL DAILY
Qty: 90 TABLET | Refills: 3 | Status: SHIPPED | OUTPATIENT
Start: 2019-12-06 | End: 2020-12-23 | Stop reason: DRUGHIGH

## 2019-12-06 RX ORDER — PREDNISONE 20 MG/1
40 TABLET ORAL DAILY
Qty: 10 TABLET | Refills: 0 | Status: SHIPPED | OUTPATIENT
Start: 2019-12-06 | End: 2020-01-13 | Stop reason: ALTCHOICE

## 2019-12-06 RX ORDER — TELMISARTAN 80 MG/1
80 TABLET ORAL DAILY
Qty: 90 TABLET | Refills: 1 | Status: SHIPPED | OUTPATIENT
Start: 2019-12-06 | End: 2021-04-20 | Stop reason: SDUPTHER

## 2019-12-06 RX ORDER — ALBUTEROL SULFATE 90 UG/1
2 AEROSOL, METERED RESPIRATORY (INHALATION) EVERY 4 HOURS PRN
Qty: 1 INHALER | Refills: 0 | Status: SHIPPED | OUTPATIENT
Start: 2019-12-06 | End: 2022-01-17 | Stop reason: ALTCHOICE

## 2019-12-06 RX ORDER — CLOTRIMAZOLE AND BETAMETHASONE DIPROPIONATE 10; .64 MG/G; MG/G
CREAM TOPICAL 2 TIMES DAILY
Qty: 60 G | Refills: 1 | Status: SHIPPED | OUTPATIENT
Start: 2019-12-06 | End: 2020-11-30 | Stop reason: ALTCHOICE

## 2019-12-06 SDOH — HEALTH STABILITY: MENTAL HEALTH: HOW OFTEN DO YOU HAVE A DRINK CONTAINING ALCOHOL?: NEVER

## 2020-01-13 ENCOUNTER — OFFICE VISIT (OUTPATIENT)
Dept: INTERNAL MEDICINE | Age: 55
End: 2020-01-13

## 2020-01-13 ENCOUNTER — LAB SERVICES (OUTPATIENT)
Dept: LAB | Age: 55
End: 2020-01-13

## 2020-01-13 VITALS
WEIGHT: 175 LBS | SYSTOLIC BLOOD PRESSURE: 120 MMHG | OXYGEN SATURATION: 98 % | BODY MASS INDEX: 32.2 KG/M2 | HEART RATE: 96 BPM | DIASTOLIC BLOOD PRESSURE: 78 MMHG | HEIGHT: 62 IN

## 2020-01-13 DIAGNOSIS — Z23 NEED FOR PNEUMOCOCCAL VACCINATION: ICD-10-CM

## 2020-01-13 DIAGNOSIS — Z23 NEED FOR INFLUENZA VACCINATION: ICD-10-CM

## 2020-01-13 DIAGNOSIS — N39.0 URINARY TRACT INFECTION WITHOUT HEMATURIA, SITE UNSPECIFIED: ICD-10-CM

## 2020-01-13 DIAGNOSIS — E78.5 HYPERLIPIDEMIA, UNSPECIFIED HYPERLIPIDEMIA TYPE: ICD-10-CM

## 2020-01-13 DIAGNOSIS — Z23 NEED FOR SHINGLES VACCINE: ICD-10-CM

## 2020-01-13 DIAGNOSIS — E11.9 DIABETIC EYE EXAM (CMD): ICD-10-CM

## 2020-01-13 DIAGNOSIS — E11.8 DM (DIABETES MELLITUS), TYPE 2 WITH COMPLICATIONS (CMD): ICD-10-CM

## 2020-01-13 DIAGNOSIS — R35.0 INCREASED URINARY FREQUENCY: ICD-10-CM

## 2020-01-13 DIAGNOSIS — F41.9 ANXIETY: ICD-10-CM

## 2020-01-13 DIAGNOSIS — Z00.00 PE (PHYSICAL EXAM), ROUTINE: Primary | ICD-10-CM

## 2020-01-13 DIAGNOSIS — I10 ESSENTIAL HYPERTENSION: ICD-10-CM

## 2020-01-13 DIAGNOSIS — Z01.00 DIABETIC EYE EXAM (CMD): ICD-10-CM

## 2020-01-13 LAB
BILIRUBIN URINE: NEGATIVE
BLOOD URINE: ABNORMAL
CLARITY: ABNORMAL
COLOR: YELLOW
GLUCOSE QUALITATIVE U: NEGATIVE
KETONES, URINE: NEGATIVE
LEUKOCYTE ESTERASE URINE: ABNORMAL
MUCOUS: ABNORMAL
NITRITE URINE: NEGATIVE
PH URINE: 6 (ref 5–7)
RED BLOOD CELLS URINE: ABNORMAL (ref 0–3)
SPECIFIC GRAVITY URINE: 1.02 (ref 1–1.03)
SQUAMOUS EPITHELIAL CELLS: ABNORMAL
URINE PROTEIN, QUAL (DIPSTICK): 30
UROBILINOGEN URINE: <2
WHITE BLOOD CELLS URINE: ABNORMAL (ref 0–5)

## 2020-01-13 PROCEDURE — 87086 URINE CULTURE/COLONY COUNT: CPT | Performed by: FAMILY MEDICINE

## 2020-01-13 PROCEDURE — 3078F DIAST BP <80 MM HG: CPT | Performed by: FAMILY MEDICINE

## 2020-01-13 PROCEDURE — 99396 PREV VISIT EST AGE 40-64: CPT | Performed by: FAMILY MEDICINE

## 2020-01-13 PROCEDURE — 90686 IIV4 VACC NO PRSV 0.5 ML IM: CPT

## 2020-01-13 PROCEDURE — 90732 PPSV23 VACC 2 YRS+ SUBQ/IM: CPT

## 2020-01-13 PROCEDURE — 81003 URINALYSIS AUTO W/O SCOPE: CPT | Performed by: FAMILY MEDICINE

## 2020-01-13 PROCEDURE — 90471 IMMUNIZATION ADMIN: CPT

## 2020-01-13 PROCEDURE — 3074F SYST BP LT 130 MM HG: CPT | Performed by: FAMILY MEDICINE

## 2020-01-13 PROCEDURE — 99213 OFFICE O/P EST LOW 20 MIN: CPT | Performed by: FAMILY MEDICINE

## 2020-01-13 PROCEDURE — 90472 IMMUNIZATION ADMIN EACH ADD: CPT

## 2020-01-13 RX ORDER — CIPROFLOXACIN 500 MG/1
500 TABLET, FILM COATED ORAL 2 TIMES DAILY
Qty: 14 TABLET | Refills: 0 | Status: SHIPPED | OUTPATIENT
Start: 2020-01-13 | End: 2020-11-30 | Stop reason: ALTCHOICE

## 2020-01-14 LAB — FINAL REPORT: NORMAL

## 2020-01-21 ENCOUNTER — TELEPHONE (OUTPATIENT)
Dept: INTERNAL MEDICINE | Age: 55
End: 2020-01-21

## 2020-01-21 DIAGNOSIS — R30.0 DYSURIA: Primary | ICD-10-CM

## 2020-01-22 ENCOUNTER — LAB SERVICES (OUTPATIENT)
Dept: LAB | Age: 55
End: 2020-01-22

## 2020-01-22 DIAGNOSIS — R30.0 DYSURIA: ICD-10-CM

## 2020-01-22 LAB
BILIRUBIN URINE: NEGATIVE
BLOOD URINE: ABNORMAL
CLARITY: ABNORMAL
COLOR: YELLOW
GLUCOSE QUALITATIVE U: NEGATIVE
KETONES, URINE: NEGATIVE
LEUKOCYTE ESTERASE URINE: NEGATIVE
MUCOUS: NORMAL
NITRITE URINE: NEGATIVE
PH URINE: 6 (ref 5–7)
RED BLOOD CELLS URINE: NORMAL (ref 0–3)
SPECIFIC GRAVITY URINE: 1.02 (ref 1–1.03)
SQUAMOUS EPITHELIAL CELLS: NORMAL
URINE PROTEIN, QUAL (DIPSTICK): NEGATIVE
UROBILINOGEN URINE: <2
WHITE BLOOD CELLS URINE: NORMAL (ref 0–5)

## 2020-01-22 PROCEDURE — 81003 URINALYSIS AUTO W/O SCOPE: CPT | Performed by: FAMILY MEDICINE

## 2020-01-23 ENCOUNTER — OFFICE VISIT (OUTPATIENT)
Dept: INTERNAL MEDICINE | Age: 55
End: 2020-01-23

## 2020-01-23 VITALS
WEIGHT: 177 LBS | HEART RATE: 68 BPM | SYSTOLIC BLOOD PRESSURE: 122 MMHG | HEIGHT: 62 IN | BODY MASS INDEX: 32.57 KG/M2 | DIASTOLIC BLOOD PRESSURE: 80 MMHG

## 2020-01-23 DIAGNOSIS — N76.0 ACUTE VAGINITIS: ICD-10-CM

## 2020-01-23 DIAGNOSIS — R10.30 LOWER ABDOMINAL PAIN, UNSPECIFIED: ICD-10-CM

## 2020-01-23 DIAGNOSIS — R10.30 LOWER ABDOMINAL PAIN: ICD-10-CM

## 2020-01-23 DIAGNOSIS — N76.0 BACTERIAL VAGINOSIS: Primary | ICD-10-CM

## 2020-01-23 DIAGNOSIS — Z11.3 ENCOUNTER FOR SCREENING FOR INFECTIONS WITH A PREDOMINANTLY SEXUAL MODE OF TRANSMISSION: ICD-10-CM

## 2020-01-23 DIAGNOSIS — Z11.3 SCREENING EXAMINATION FOR STD (SEXUALLY TRANSMITTED DISEASE): ICD-10-CM

## 2020-01-23 DIAGNOSIS — B96.89 BACTERIAL VAGINOSIS: Primary | ICD-10-CM

## 2020-01-23 LAB
25(OH)D3 SERPL-MCNC: NORMAL NG/ML
C TRACH DNA GENITAL QL NAA+PROBE: NEGATIVE
CLUE CELLS: NORMAL
N GONORRHOEA DNA GENITAL QL NAA+PROBE: NEGATIVE
TRICHOMONAS ANTIGEN: NORMAL
TRICHOMONAS VAGINALIS (TV PCR): NEGATIVE
TRICHOMONAS: NORMAL
WP WBC: NORMAL

## 2020-01-23 PROCEDURE — 3079F DIAST BP 80-89 MM HG: CPT | Performed by: FAMILY MEDICINE

## 2020-01-23 PROCEDURE — 87210 SMEAR WET MOUNT SALINE/INK: CPT | Performed by: FAMILY MEDICINE

## 2020-01-23 PROCEDURE — 87491 CHLMYD TRACH DNA AMP PROBE: CPT | Performed by: FAMILY MEDICINE

## 2020-01-23 PROCEDURE — 87661 TRICHOMONAS VAGINALIS AMPLIF: CPT | Performed by: FAMILY MEDICINE

## 2020-01-23 PROCEDURE — 82565 ASSAY OF CREATININE: CPT | Performed by: FAMILY MEDICINE

## 2020-01-23 PROCEDURE — 99214 OFFICE O/P EST MOD 30 MIN: CPT | Performed by: FAMILY MEDICINE

## 2020-01-23 PROCEDURE — 3074F SYST BP LT 130 MM HG: CPT | Performed by: FAMILY MEDICINE

## 2020-01-23 PROCEDURE — 87591 N.GONORRHOEAE DNA AMP PROB: CPT | Performed by: FAMILY MEDICINE

## 2020-01-23 RX ORDER — FLUCONAZOLE 150 MG/1
150 TABLET ORAL ONCE
Qty: 1 TABLET | Refills: 0 | Status: SHIPPED | OUTPATIENT
Start: 2020-01-23 | End: 2020-01-23

## 2020-01-29 ENCOUNTER — IMAGING SERVICES (OUTPATIENT)
Dept: ULTRASOUND IMAGING | Age: 55
End: 2020-01-29

## 2020-02-04 ENCOUNTER — IMAGING SERVICES (OUTPATIENT)
Dept: CT IMAGING | Age: 55
End: 2020-02-04

## 2020-02-05 ENCOUNTER — IMAGING SERVICES (OUTPATIENT)
Dept: CT IMAGING | Age: 55
End: 2020-02-05
Attending: FAMILY MEDICINE

## 2020-02-05 ENCOUNTER — TELEPHONE (OUTPATIENT)
Dept: INTERNAL MEDICINE | Age: 55
End: 2020-02-05

## 2020-02-05 DIAGNOSIS — R10.30 LOWER ABDOMINAL PAIN, UNSPECIFIED: ICD-10-CM

## 2020-02-05 DIAGNOSIS — Z09 FOLLOW-UP EXAM: Primary | ICD-10-CM

## 2020-02-05 LAB — GFR SERPLBLD BASED ON 1.73 SQ M-ARVRAT: 60 ML/MIN

## 2020-02-05 PROCEDURE — 74177 CT ABD & PELVIS W/CONTRAST: CPT | Performed by: RADIOLOGY

## 2020-02-11 DIAGNOSIS — Z09 FOLLOW-UP EXAM: ICD-10-CM

## 2020-06-30 DIAGNOSIS — F34.1 DYSTHYMIA: ICD-10-CM

## 2020-07-01 RX ORDER — PAROXETINE HYDROCHLORIDE 20 MG/1
20 TABLET, FILM COATED ORAL EVERY MORNING
Qty: 90 TABLET | Refills: 1 | Status: SHIPPED | OUTPATIENT
Start: 2020-07-01 | End: 2021-01-11 | Stop reason: SDUPTHER

## 2020-07-20 ENCOUNTER — TELEPHONE (OUTPATIENT)
Dept: INTERNAL MEDICINE | Age: 55
End: 2020-07-20

## 2020-07-21 ENCOUNTER — APPOINTMENT (OUTPATIENT)
Dept: INTERNAL MEDICINE | Age: 55
End: 2020-07-21

## 2020-07-27 ENCOUNTER — TELEPHONE (OUTPATIENT)
Dept: INTERNAL MEDICINE | Age: 55
End: 2020-07-27

## 2020-07-27 DIAGNOSIS — Z09 FOLLOW-UP EXAM: Primary | ICD-10-CM

## 2020-07-27 DIAGNOSIS — Z12.31 ENCOUNTER FOR SCREENING MAMMOGRAM FOR MALIGNANT NEOPLASM OF BREAST: ICD-10-CM

## 2020-07-27 DIAGNOSIS — R92.8 ABNORMAL MAMMOGRAM: ICD-10-CM

## 2020-08-04 ENCOUNTER — TELEPHONE (OUTPATIENT)
Dept: INTERNAL MEDICINE | Age: 55
End: 2020-08-04

## 2020-09-30 ENCOUNTER — TELEPHONE (OUTPATIENT)
Dept: GASTROENTEROLOGY | Age: 55
End: 2020-09-30

## 2020-10-06 ENCOUNTER — IMAGING SERVICES (OUTPATIENT)
Dept: OTHER | Age: 55
End: 2020-10-06

## 2020-10-06 ENCOUNTER — TELEPHONE (OUTPATIENT)
Dept: INTERNAL MEDICINE | Age: 55
End: 2020-10-06

## 2020-10-06 DIAGNOSIS — R92.8 ABNORMAL MAMMOGRAM: Primary | ICD-10-CM

## 2020-10-07 DIAGNOSIS — R92.8 ABNORMAL MAMMOGRAM: ICD-10-CM

## 2020-10-07 DIAGNOSIS — Z12.31 ENCOUNTER FOR SCREENING MAMMOGRAM FOR MALIGNANT NEOPLASM OF BREAST: ICD-10-CM

## 2020-10-07 DIAGNOSIS — Z09 FOLLOW-UP EXAM: ICD-10-CM

## 2020-11-09 ENCOUNTER — APPOINTMENT (OUTPATIENT)
Dept: GENERAL RADIOLOGY | Age: 55
End: 2020-11-09
Attending: EMERGENCY MEDICINE
Payer: COMMERCIAL

## 2020-11-09 ENCOUNTER — HOSPITAL ENCOUNTER (EMERGENCY)
Age: 55
Discharge: HOME | End: 2020-11-10
Attending: EMERGENCY MEDICINE
Payer: COMMERCIAL

## 2020-11-09 DIAGNOSIS — U07.1 COVID-19: Primary | ICD-10-CM

## 2020-11-09 PROCEDURE — 83880 ASSAY OF NATRIURETIC PEPTIDE: CPT | Performed by: EMERGENCY MEDICINE

## 2020-11-09 PROCEDURE — 87040 BLOOD CULTURE FOR BACTERIA: CPT | Performed by: EMERGENCY MEDICINE

## 2020-11-09 PROCEDURE — 86140 C-REACTIVE PROTEIN: CPT | Performed by: EMERGENCY MEDICINE

## 2020-11-09 PROCEDURE — 85379 FIBRIN DEGRADATION QUANT: CPT | Performed by: EMERGENCY MEDICINE

## 2020-11-09 PROCEDURE — 80053 COMPREHEN METABOLIC PANEL: CPT | Performed by: EMERGENCY MEDICINE

## 2020-11-09 PROCEDURE — 71045 X-RAY EXAM CHEST 1 VIEW: CPT | Performed by: EMERGENCY MEDICINE

## 2020-11-09 PROCEDURE — 82728 ASSAY OF FERRITIN: CPT | Performed by: EMERGENCY MEDICINE

## 2020-11-09 PROCEDURE — 99284 EMERGENCY DEPT VISIT MOD MDM: CPT | Performed by: EMERGENCY MEDICINE

## 2020-11-09 PROCEDURE — 84145 PROCALCITONIN (PCT): CPT | Performed by: EMERGENCY MEDICINE

## 2020-11-09 PROCEDURE — 93010 ELECTROCARDIOGRAM REPORT: CPT | Performed by: EMERGENCY MEDICINE

## 2020-11-09 PROCEDURE — 85025 COMPLETE CBC W/AUTO DIFF WBC: CPT | Performed by: EMERGENCY MEDICINE

## 2020-11-09 PROCEDURE — 93005 ELECTROCARDIOGRAM TRACING: CPT

## 2020-11-09 PROCEDURE — 83615 LACTATE (LD) (LDH) ENZYME: CPT | Performed by: EMERGENCY MEDICINE

## 2020-11-09 PROCEDURE — 84484 ASSAY OF TROPONIN QUANT: CPT | Performed by: EMERGENCY MEDICINE

## 2020-11-09 PROCEDURE — 36415 COLL VENOUS BLD VENIPUNCTURE: CPT | Performed by: EMERGENCY MEDICINE

## 2020-11-09 PROCEDURE — 82550 ASSAY OF CK (CPK): CPT | Performed by: EMERGENCY MEDICINE

## 2020-11-10 VITALS
OXYGEN SATURATION: 95 % | DIASTOLIC BLOOD PRESSURE: 82 MMHG | TEMPERATURE: 100 F | BODY MASS INDEX: 31 KG/M2 | RESPIRATION RATE: 14 BRPM | WEIGHT: 170 LBS | HEART RATE: 97 BPM | SYSTOLIC BLOOD PRESSURE: 116 MMHG

## 2020-11-10 NOTE — ED PROVIDER NOTES
Patient Seen in: [facility] In [place]      History   Patient presents with:  Fever  Cough/URI    Stated Complaint: covid exposure, cough and congestion    HPI    Patient exposed to family member diagnosed with COVID-23.   For the past None (Room air)       Current:/82   Pulse 97   Temp 100.3 °F (37.9 °C) (Oral)   Resp 14   Wt 77.1 kg   SpO2 95%   BMI 31.09 kg/m²         Physical Exam  Appears healthy and resting comfortably.   Skin: Warm and dry without cyanosis or pallor  HEENT: T bpm.  Intervals and axes as noted on computer reading. Nonspecific ST-T wave changes. Portable chest x-ray: Negative study                    MDM      Work-up unremarkable. Patient likely has COVID-19 but seems to be tolerating it well.   Covid swab se

## 2020-11-10 NOTE — ED INITIAL ASSESSMENT (HPI)
Pt in er for c/o 7 days of fever, congestion, cough post known covid exposure, pt reports taking zithromax, solumedrol, albuterol, tamiflu, and zince since 11/7

## 2020-11-12 ENCOUNTER — APPOINTMENT (OUTPATIENT)
Dept: GENERAL RADIOLOGY | Facility: HOSPITAL | Age: 55
DRG: 177 | End: 2020-11-12
Attending: EMERGENCY MEDICINE
Payer: COMMERCIAL

## 2020-11-12 ENCOUNTER — HOSPITAL ENCOUNTER (INPATIENT)
Facility: HOSPITAL | Age: 55
LOS: 7 days | Discharge: HOME | DRG: 177 | End: 2020-11-19
Attending: EMERGENCY MEDICINE | Admitting: HOSPITALIST
Payer: COMMERCIAL

## 2020-11-12 ENCOUNTER — APPOINTMENT (OUTPATIENT)
Dept: GENERAL RADIOLOGY | Facility: HOSPITAL | Age: 55
DRG: 177 | End: 2020-11-12
Attending: STUDENT IN AN ORGANIZED HEALTH CARE EDUCATION/TRAINING PROGRAM
Payer: COMMERCIAL

## 2020-11-12 DIAGNOSIS — U07.1 PNEUMONIA DUE TO COVID-19 VIRUS: ICD-10-CM

## 2020-11-12 DIAGNOSIS — J12.82 PNEUMONIA DUE TO COVID-19 VIRUS: ICD-10-CM

## 2020-11-12 DIAGNOSIS — R09.02 HYPOXIA: Primary | ICD-10-CM

## 2020-11-12 PROCEDURE — 70110 X-RAY EXAM OF JAW 4/> VIEWS: CPT | Performed by: STUDENT IN AN ORGANIZED HEALTH CARE EDUCATION/TRAINING PROGRAM

## 2020-11-12 PROCEDURE — 99222 1ST HOSP IP/OBS MODERATE 55: CPT | Performed by: STUDENT IN AN ORGANIZED HEALTH CARE EDUCATION/TRAINING PROGRAM

## 2020-11-12 PROCEDURE — 71045 X-RAY EXAM CHEST 1 VIEW: CPT | Performed by: EMERGENCY MEDICINE

## 2020-11-12 RX ORDER — ZINC SULFATE 50(220)MG
220 CAPSULE ORAL 2 TIMES DAILY
COMMUNITY
Start: 2020-11-07

## 2020-11-12 RX ORDER — DEXTROSE AND SODIUM CHLORIDE 5; .45 G/100ML; G/100ML
INJECTION, SOLUTION INTRAVENOUS CONTINUOUS
Status: ACTIVE | OUTPATIENT
Start: 2020-11-12 | End: 2020-11-12

## 2020-11-12 RX ORDER — ACETAMINOPHEN 325 MG/1
650 TABLET ORAL EVERY 6 HOURS PRN
Status: DISCONTINUED | OUTPATIENT
Start: 2020-11-12 | End: 2020-11-19

## 2020-11-12 RX ORDER — ALBUTEROL SULFATE 2.5 MG/3ML
2.5 SOLUTION RESPIRATORY (INHALATION) EVERY 6 HOURS PRN
COMMUNITY

## 2020-11-12 RX ORDER — PAROXETINE 10 MG/1
10 TABLET, FILM COATED ORAL EVERY MORNING
Status: DISCONTINUED | OUTPATIENT
Start: 2020-11-13 | End: 2020-11-19

## 2020-11-12 RX ORDER — BENZONATATE 200 MG/1
200 CAPSULE ORAL 3 TIMES DAILY PRN
Status: DISCONTINUED | OUTPATIENT
Start: 2020-11-12 | End: 2020-11-19

## 2020-11-12 RX ORDER — PAROXETINE HYDROCHLORIDE 20 MG/1
10 TABLET, FILM COATED ORAL EVERY MORNING
COMMUNITY
Start: 2020-07-01

## 2020-11-12 RX ORDER — ATORVASTATIN CALCIUM 20 MG/1
20 TABLET, FILM COATED ORAL DAILY
COMMUNITY

## 2020-11-12 RX ORDER — DEXAMETHASONE SODIUM PHOSPHATE 4 MG/ML
6 VIAL (ML) INJECTION ONCE
Status: COMPLETED | OUTPATIENT
Start: 2020-11-12 | End: 2020-11-12

## 2020-11-12 RX ORDER — POTASSIUM CHLORIDE 20 MEQ/1
40 TABLET, EXTENDED RELEASE ORAL EVERY 4 HOURS
Status: COMPLETED | OUTPATIENT
Start: 2020-11-12 | End: 2020-11-13

## 2020-11-12 RX ORDER — IBUPROFEN 400 MG/1
400 TABLET ORAL ONCE
Status: COMPLETED | OUTPATIENT
Start: 2020-11-12 | End: 2020-11-12

## 2020-11-12 RX ORDER — BENZONATATE 100 MG/1
100 CAPSULE ORAL 3 TIMES DAILY PRN
COMMUNITY

## 2020-11-12 RX ORDER — METHYLPREDNISOLONE 4 MG/1
TABLET ORAL SEE ADMIN INSTRUCTIONS
Status: ON HOLD | COMMUNITY
Start: 2020-11-07 | End: 2020-11-19

## 2020-11-12 RX ORDER — ENOXAPARIN SODIUM 100 MG/ML
40 INJECTION SUBCUTANEOUS DAILY
Status: DISCONTINUED | OUTPATIENT
Start: 2020-11-12 | End: 2020-11-19

## 2020-11-12 NOTE — ED NOTES
Pt going to room 510, report given to Cynthia Yen. Transport paged.  Pt aware of her admission and verbalizing understanding

## 2020-11-12 NOTE — ED PROVIDER NOTES
Patient Seen in: [facility] Emergency Department      History   Patient presents with:  Difficulty Breathing  Cough/URI    Stated Complaint: [address]  PENDING.  COUGH/SOB. 02 SAT 87%    HPI    This is a 30-year-old female wi complaint: FAMILY MEMBERS + FOR COVID, RESULTS PENDING. COUGH/SOB. 02 SAT 87%  Other systems are as noted in HPI. Constitutional and vital signs reviewed. All other systems reviewed and negative except as noted above.     Physical Exam     ED Triage V within normal limits   PRO BETA NATRIURETIC PEPTIDE - Normal   D-DIMER - Normal   TROPONIN I - Normal   PROCALCITONIN - Normal   CK CREATINE KINASE (NOT CREATININE) - Normal   CBC WITH DIFFERENTIAL WITH PLATELET    Narrative:      The following orders were sats came up to 96%. Admission disposition: 11/12/2020  3:22 PM         CBC: White blood cell count 12. 3. Hemoglobin 12.8. Platelet 781. CMP: BUN 13    0.9. Glucose 123. Sodium 135. Bicarb 23. . Troponin negative. D-dimer negative.   CPK no

## 2020-11-12 NOTE — H&P
[facility]  History and Physical     Jason Granados Patient Status:  Inpatient    1965 MRN [id number]   [facility] 5NW-A Attending Harman Harden MD   Hosp Day # 0 PCP Kirby Rudolph MD     Chief Complaint: Fatigue MG/3ML) 0.083% Inhalation Nebu Soln, Take 2.5 mg by nebulization every 6 (six) hours as needed. , Disp: , Rfl:     •  benzonatate 100 MG Oral Cap, Take 100 mg by mouth 3 (three) times daily as needed. , Disp: , Rfl:     •  PARoxetine HCl 20 MG Oral Tab, Take CA 8.9 9.0   ALB 3.5 3.2*    135*   K 3.5 3.6    104   CO2 24.0 23.0   ALKPHO 77 69   AST 32 10*   ALT 37 35   BILT 0.3 0.5   TP 8.2 8.2       Estimated Creatinine Clearance: 57.2 mL/min (based on SCr of 0.9 mg/dL).     No results for input(s)

## 2020-11-13 PROCEDURE — XW033E5 INTRODUCTION OF REMDESIVIR ANTI-INFECTIVE INTO PERIPHERAL VEIN, PERCUTANEOUS APPROACH, NEW TECHNOLOGY GROUP 5: ICD-10-PCS | Performed by: STUDENT IN AN ORGANIZED HEALTH CARE EDUCATION/TRAINING PROGRAM

## 2020-11-13 PROCEDURE — 3E0333Z INTRODUCTION OF ANTI-INFLAMMATORY INTO PERIPHERAL VEIN, PERCUTANEOUS APPROACH: ICD-10-PCS | Performed by: STUDENT IN AN ORGANIZED HEALTH CARE EDUCATION/TRAINING PROGRAM

## 2020-11-13 PROCEDURE — 99232 SBSQ HOSP IP/OBS MODERATE 35: CPT | Performed by: INTERNAL MEDICINE

## 2020-11-13 PROCEDURE — XW033H5 INTRODUCTION OF TOCILIZUMAB INTO PERIPHERAL VEIN, PERCUTANEOUS APPROACH, NEW TECHNOLOGY GROUP 5: ICD-10-PCS | Performed by: STUDENT IN AN ORGANIZED HEALTH CARE EDUCATION/TRAINING PROGRAM

## 2020-11-13 RX ORDER — DEXAMETHASONE SODIUM PHOSPHATE 4 MG/ML
6 VIAL (ML) INJECTION DAILY
Status: DISCONTINUED | OUTPATIENT
Start: 2020-11-13 | End: 2020-11-19

## 2020-11-13 RX ORDER — GUAIFENESIN 600 MG
1200 TABLET, EXTENDED RELEASE 12 HR ORAL 2 TIMES DAILY
Status: DISCONTINUED | OUTPATIENT
Start: 2020-11-13 | End: 2020-11-19

## 2020-11-13 RX ORDER — CODEINE PHOSPHATE AND GUAIFENESIN 10; 100 MG/5ML; MG/5ML
5 SOLUTION ORAL EVERY 4 HOURS PRN
Status: DISCONTINUED | OUTPATIENT
Start: 2020-11-13 | End: 2020-11-13

## 2020-11-13 RX ORDER — HYDROCODONE BITARTRATE AND HOMATROPINE METHYLBROMIDE ORAL SOLUTION 5; 1.5 MG/5ML; MG/5ML
5 LIQUID ORAL EVERY 4 HOURS PRN
Status: DISCONTINUED | OUTPATIENT
Start: 2020-11-13 | End: 2020-11-19

## 2020-11-13 NOTE — PLAN OF CARE
Pt A&Ox4, , O2 increased from 4L to 6L highflow NC this AM, tele NSR-ST. Pt with low-grade temp this morning, given prn tylenol. Pt with strong, nonproductive cough,  given PRN hydromet. IV decadron. Remdesivir. Actemra.  Encouraged to lay on side, can't ordered  Outcome: Progressing     Problem: RESPIRATORY - ADULT  Goal: Achieves optimal ventilation and oxygenation  Description: INTERVENTIONS:  - Assess for changes in respiratory status  - Assess for changes in mentation and behavior  - Position to facil

## 2020-11-13 NOTE — PAYOR COMM NOTE
--------------  ADMISSION REVIEW     Payor: [address]  Subscriber #:  [id number]  Authorization Number: [id number]      Admit date: 11/12/20  Admit time: 1659       Admitting Physician: Loi Ruiz MD  Attending Physician:  Mary Ray 111/74   Pulse 117   Temp 98.7 °F (37.1 °C) (Temporal)   Resp 20   Ht 157.5 cm (5' 2\")   Wt 77.1 kg   SpO2 90%   BMI 31.09 kg/m²         Physical Exam    GENERAL: Awake, alert oriented x3, ill appearing. SKIN: Normal, warm, and dry.   HEENT:  Pupils [facility] -----------         ------                     CBC W/ DIFFERENTIAL[[id number]]          Abnormal            Final result                 Please view results for these tests on the individual orders.    URINALYSIS WITH CULTURE RE aware of plan for admission. Case discussed with [facility] hospitalist.         Case discussed with [facility] hospitalist Dr. Hodan Davis. Patient admitted to medical floor for further management.     Disposition and Plan     Clinical Impression:  Hypoxia  (primary en organomegaly. Neurologic: No focal neurological deficits. CNII-XII grossly intact. Musculoskeletal: Moves all extremities. Extremities: No edema or cyanosis. Integument: No rashes or lesions. Psychiatric: Appropriate mood and affect.       Diagnostic 100 mg     Date Action Dose Route User    11/13/2020 0827 Given 100 mg Oral Shari Kasey, ALEX    11/13/2020 0127 Given 100 mg Oral Christoph Neff RN    11/12/2020 1813 Given 100 mg Oral Shari Kasey, RN      ibuprofen (MOTRIN) tab 400 mg     Date Acti

## 2020-11-13 NOTE — PROGRESS NOTES
NURSING ADMISSION NOTE      Patient admitted via Cart  Oriented to room. Safety precautions initiated. Bed in low position. Call light in reach. Pt A&Ox4,  on 3L NC with spO2 >93%, tele NSR, afebrile VSS.  Pt c/o pain to the right side of her m

## 2020-11-13 NOTE — CONSULTS
INFECTIOUS DISEASE CONSULTATION    Iraida Concepcion Patient Status:  Inpatient    1965 MRN [id number]   [facility] 5NW-A Attending Eliane Mcmullen, DO   Hosp Day # 1 PCP Pauline Storey Allergies    Medications:    Current Facility-Administered Medications:   •  guaiFENesin-codeine (ROBITUSSIN AC) 100-10 MG/5ML syrup 5 mL, 5 mL, Oral, Q4H PRN  •  sodium chloride 0.9% IV bolus 500 mL, 500 mL, Intravenous, PRN  •  Enoxaparin Sodium (LOVENOX labored  Abdomen: Soft, nontender, nondistended. Musculoskeletal: Full range of motion of all extremities. No swelling noted. Joints: no effusions  Skin: No lesions.  No erythema, no open wounds      Laboratory Data:  Laboratory data reviewed      Rece Essential hypertension     Pure hypercholesterolemia     COVID-19     Hypoxia     Pneumonia due to COVID-19 virus      ASSESSMENT/PLAN:  1.  COVID-19 pneumonia  Household transmission: Lives with daughter who works at [employer] and was sick first  -sy

## 2020-11-13 NOTE — PLAN OF CARE
Pt aox4, anxious during noc shift. O2 saturations were teetering from 89-95% on 3L. RA is baseline. Pt gets dyspneic and coughs with exertion. Received PRN meds for cough. Tele, . Ekg-NSR. Trops negative. BM during shift. GI/ wnl.  Up SBA, uses bedside

## 2020-11-13 NOTE — PROGRESS NOTES
[facility]  Progress Note     Regino Sinclair Patient Status:  Inpatient    1965 MRN [id number]   [facility] 5NW-A Attending Alyson Emanuel, [address] [facility] Day # 1 PCP Coni Jean MD     Chief Complaint: SOB, cough    S: Pa results for input(s): PTP, INR in the last 168 hours.     Recent Labs   Lab 11/09/20  2335 11/12/20  1413 11/12/20  1800 11/12/20 2034 11/13/20  0052   TROP <0.045 <0.045 <0.045 <0.045 <0.045    73  --   --   --      Imaging: Imaging data reviewed in

## 2020-11-13 NOTE — DIETARY NOTE
[address] [facility]     Admitting diagnosis:  Hypoxia [R09.02]  Pneumonia due to COVID-19 virus [U07.1, J12.89]    Ht: 157.5 cm (5' 2\")  Wt: 78.5 kg (173 lb). Body mass index is 31.64 kg/m².   IBW: 50 kg    Labs/Meds

## 2020-11-14 ENCOUNTER — APPOINTMENT (OUTPATIENT)
Dept: GENERAL RADIOLOGY | Facility: HOSPITAL | Age: 55
DRG: 177 | End: 2020-11-14
Attending: INTERNAL MEDICINE
Payer: COMMERCIAL

## 2020-11-14 PROCEDURE — 99232 SBSQ HOSP IP/OBS MODERATE 35: CPT | Performed by: INTERNAL MEDICINE

## 2020-11-14 PROCEDURE — 71045 X-RAY EXAM CHEST 1 VIEW: CPT | Performed by: INTERNAL MEDICINE

## 2020-11-14 RX ORDER — FUROSEMIDE 10 MG/ML
20 INJECTION INTRAMUSCULAR; INTRAVENOUS ONCE
Status: COMPLETED | OUTPATIENT
Start: 2020-11-14 | End: 2020-11-14

## 2020-11-14 RX ORDER — LOPERAMIDE HYDROCHLORIDE 2 MG/1
2 CAPSULE ORAL 4 TIMES DAILY PRN
Status: DISCONTINUED | OUTPATIENT
Start: 2020-11-14 | End: 2020-11-19

## 2020-11-14 NOTE — PROGRESS NOTES
[facility] H[facility]PITALIST  Progress Note     Prabha Flores Patient Status:  Inpatient    1965 MRN [id number]   [facility] 5NW-A Attending Kenyon Rahman, [address] [facility] Day # 2 PCP Krystin Pa MD     Chief Complaint: SOB, cough    S: Ov 24.0   ALKPHO 69 74 81   AST 10* 23 24   ALT 35 33 37   BILT 0.5 0.4 0.5   TP 8.2 7.8 8.6*     Estimated Creatinine Clearance: 72.5 mL/min (based on SCr of 0.71 mg/dL). No results for input(s): PTP, INR in the last 168 hours.     Recent Labs   Lab 11/09/

## 2020-11-14 NOTE — PLAN OF CARE
Assumed care of patient at [facility]. Monitor on telemetry NSR/ST, continuous pulse ox Received on 6L increased O2 with coughing fits/BSC, PRN hydromet provided for cough. On remdesivir and decadron.  Labs in am. Will continue to monitor

## 2020-11-14 NOTE — PROGRESS NOTES
[facility]                INFECTIOUS DISEASE PROGRESS NOTE    Mariel Sell Patient Status:  Inpatient    1965 MRN [id number]   [facility] 5NW-A Attending Leslie Naik,    Hosp Day # 2 PCP Yue Cabello MD     An 11/13/20  0511 11/14/20  0441   * 117* 139*   BUN 13 19* 28*   CREATSERUM 0.90 0.66 0.71   GFRAA 84 117 112   GFRNAA 73 101 98   CA 9.0 9.1 9.0   ALB 3.2* 2.8* 3.1*   * 139 139   K 3.6 4.9 4.3    111 109   CO2 23.0 23.0 24.0   ALKPHO 69

## 2020-11-14 NOTE — PLAN OF CARE
Pt had increased O2 needs went from 7L high flow to 15L high flow, pulse ox 91%. MD notified IV lasix provided. Pulmonologist consulted and notified STAT chest XR order. ID updated. Pt O2 needs increased placed on non rebreather.  RR called placed on Vapoth

## 2020-11-14 NOTE — CONSULTS
Pulmonary Consult     Assessment / Plan:  1. Acute hypoxemic respiratory failure  - Due to covid infection  - Currently on vapotherm 40L/100%  - Wean as tolerated   - Awake prone, IS use   2.  COVID-19 pneumonia  - Tested positive on 11/2  - Decadron   - s/ 11/12/2020 at 0900    •  albuterol sulfate (2.5 MG/3ML) 0.083% Inhalation Nebu Soln, Take 2.5 mg by nebulization every 6 (six) hours as needed. , Disp: , Rfl: , 11/12/2020 at 1000    •  benzonatate 100 MG Oral Cap, Take 100 mg by mouth 3 (three) times daily BP Location:   Left arm    Pulse: 87 63 75    Resp:   20    Temp:   98.3 °F (36.8 °C)    TempSrc:   Oral    SpO2: 92% 99% 99% 98%   Weight:       Height:         General: no apparent distress, conversant  Skin: no rash, ulcers or subcutaneous nodules  Ey

## 2020-11-14 NOTE — PROGRESS NOTES
Assumed care at 0700. Patient is A &O x4. She is requiring more O2 needs over night. Went up from 7L to Vapotherm 100/30. She is not able to prone so MD suggested that she sit on edge of bed and lean forward on tray table. She is using bedpan to void.

## 2020-11-15 PROCEDURE — 99232 SBSQ HOSP IP/OBS MODERATE 35: CPT | Performed by: HOSPITALIST

## 2020-11-15 NOTE — PROGRESS NOTES
[facility]  Progress Note     Kenyetta Casillas Patient Status:  Inpatient    1965 MRN [id number]   [facility] 5NW-A Attending Jesus Nair, [address] [facility] Day # 3 PCP Maricruz Santos MD     Chief Complaint: SOB, cough    S: pt in the last 168 hours. Recent Labs   Lab 11/09/20  2335 11/12/20  1413 11/12/20  1800 11/12/20 2034 11/13/20  0052   TROP <0.045 <0.045 <0.045 <0.045 <0.045    73  --   --   --      Imaging: Imaging data reviewed in Epic.     Medications:   • [place]

## 2020-11-15 NOTE — PROGRESS NOTES
Critical Care Progress Note     Assessment / Plan:  1. Acute hypoxemic respiratory failure  - Due to covid infection  - Currently on vapotherm 30L/75%  - Wean as tolerated   - Awake prone, IS use   2.  COVID-19 pneumonia  - Tested positive on 11/2  - Decadr

## 2020-11-15 NOTE — PLAN OF CARE
Assumed care for pt at 0700  Aox4, anxious at times, up with standby assist. NSR on cardiac monitor. Adequate saturation on 75% 30 liters-sats in the upper 90's. Lung sounds diminished. Can reach 250 on IS-will continue to work on this.  Non-productive, dry

## 2020-11-15 NOTE — PROGRESS NOTES
[facility]                INFECTIOUS DISEASE PROGRESS NOTE    Peyman Day Patient Status:  Inpatient    1965 MRN [id number]   [facility] 5NW-A Attending Brayan Okeefe,    Hosp Day # 3 PCP Lola Campa MD     An Encounter on 11/12/20   1.  BLOOD CULTURE     Status: None (Preliminary result)    Collection Time: 11/12/20  2:15 PM    Specimen: Blood,peripheral   Result Value Ref Range    Blood Culture Result No Growth 3 Days N/A         Radiology  cxr reviewed    Prob

## 2020-11-16 PROCEDURE — 99232 SBSQ HOSP IP/OBS MODERATE 35: CPT | Performed by: HOSPITALIST

## 2020-11-16 RX ORDER — TEMAZEPAM 7.5 MG/1
7.5 CAPSULE ORAL NIGHTLY PRN
Status: DISCONTINUED | OUTPATIENT
Start: 2020-11-16 | End: 2020-11-19

## 2020-11-16 NOTE — PROGRESS NOTES
[facility]                INFECTIOUS DISEASE PROGRESS NOTE    Odalis Hartman Patient Status:  Inpatient    1965 MRN [id number]   [facility] 5NW-A Attending Lorena Swanson,    Hosp Day # 4 PCP Siena Sterling MD     An [facility] Encounter on 11/12/20   1.  BLOOD CULTURE     Status: None (Preliminary result)    Collection Time: 11/12/20  2:15 PM    Specimen: Blood,peripheral   Result Value Ref Range    Blood Culture Result No Growth 3 Days N/A

## 2020-11-16 NOTE — PROGRESS NOTES
[facility]ITAL[facility]  Progress Note     Iraida Concepcion Patient Status:  Inpatient    1965 MRN [id number]   [facility] 5NW-A Attending Eliane Mcmullen, DO   Hosp Day # 4 PCP Alexys Mclain MD     Chief Complaint: SOB, cough    S: pt mL/min (based on SCr of 0.67 mg/dL). No results for input(s): PTP, INR in the last 168 hours.     Recent Labs   Lab 11/09/20  2335 11/12/20  1413 11/12/20  1800 11/12/20  2034 11/13/20  0052   TROP <0.045 <0.045 <0.045 <0.045 <0.045    73  --   --

## 2020-11-16 NOTE — PROGRESS NOTES
Pulmonary Progress Note      NAME: Rosa Sethi - ROOM: [facility]510-A - MRN: [id number] - Age: 50year old - : 1965    Assessment/Plan:  1.  Acute hypoxemic respiratory failure  - Due to covid infection  - Currently on vapotherm 20 L / 50% which is i 11/14/20  0441 11/15/20  1154 11/16/20  0800   * 136* 103*   BUN 28* 42* 38*   CREATSERUM 0.71 0.73 0.67   GFRAA 112 109 116   GFRNAA 98 94 101   CA 9.0 9.0 8.2*   ALB 3.1* 2.7* 2.7*    138 140   K 4.3 4.4 4.0    107 109   CO2 24.0 25.0

## 2020-11-16 NOTE — PLAN OF CARE
Problem: Patient/Family Goals  Goal: Patient/Family Long Term Goal  Description: Patient's Long Term Goal:To go home.     Interventions:  - Remdesivir  -Decadron  -prone  IS  - See additional Care Plan goals for specific interventions  Outcome: Progressin minimize respiratory effort  - Oxygen supplementation based on oxygen saturation or ABGs  - Provide Smoking Cessation handout, if applicable  - Encourage broncho-pulmonary hygiene including cough, deep breathe, Incentive Spirometry  - Assess the need for s

## 2020-11-16 NOTE — PAYOR COMM NOTE
--------------  CONTINUED STAY REVIEW    Payor: [address]  Subscriber #:  [id number]  Authorization Number: [id number]      Admit date: 11/12/20  Admit time: 1659    Admitting Physician: Shanon Harman MD  Attending Physician:  Garrick Tian, 8.6*      Estimated Creatinine Clearance: 72.5 mL/min (based on SCr of 0.71 mg/dL).     No results for input(s): PTP, INR in the last 168 hours.             Recent Labs   Lab 11/09/20  2335 11/12/20  1413 11/12/20  1800 11/12/20  2034 11/13/20  0052   TROP Route User    11/16/2020 1012 Given 6 mg Intravenous Maribell Payan RN      Enoxaparin Sodium (LOVENOX) 40 MG/0.4ML injection 40 mg     Date Action Dose Route User    11/15/2020 2144 Given 40 mg Subcutaneous (Left Lower Abdomen) Carri Zamora RN Past Medical History:   Diagnosis Date   • Anxiety state     • Borderline diabetes     • Calculus of kidney     • Esophageal reflux     • Essential hypertension     • GERD       intermittent   • High blood pressure     • High cholesterol     • HYPERLIPID mg by nebulization every 6 (six) hours as needed. , Disp: , Rfl:      •  benzonatate 100 MG Oral Cap, Take 100 mg by mouth 3 (three) times daily as needed. , Disp: , Rfl:      •  PARoxetine HCl 20 MG Oral Tab, Take 10 mg by mouth every morning., Disp: , Rfl: (36.8 °C) 62 20 96/76 95 % — High flow/High humidity 30 L/min SE     11/14/20 0440 — 90 — 134/78 90 % — Non-rebreather mask — ST   11/14/20 0435 — 86 — — 82 %Abnormal  — High flow nasal cannula 15 L/min ST   11/14/20 0345 — 67 — — 91 % — — 15 L/min ST   11

## 2020-11-16 NOTE — PROGRESS NOTES
PATIENT REPORTS HAVING \"A LOT OF PHLEGM THAT WONT COME OUT\". DISCUSSED WITH SOPHIA (RESPIRATORY THERAPIST). HE IS GOING TO BRING IN A FLUTTER VALVE AND INSTRUCT PATIENT ON ITS USE. SHE REMAINS ON VAPOTHERM - 70%, 30L.   AND MAINTAINING OXYGEN SATURA

## 2020-11-16 NOTE — PAYOR COMM NOTE
--------------  CONTINUED STAY REVIEW    Payor: [address]  Subscriber #:  [id number]  Authorization Number: [id number]      Admit date: 11/12/20  Admit time: 1659    Admitting Physician: Sudhir Kenney MD  Attending Physician:  Junior Joseph, ALKPHO 81 65 60   AST 24 21 18   ALT 37 34 34   BILT 0.5 0.6 0.5   TP 8.6* 7.3 7.1         No results found for: [facility]  Microbiology     Recent Results         Hospital Encounter on 11/12/20   1.  BLOOD CULTURE     Status: None (Preliminary result)     [place] 11/16/2020 1012 Given 1,200 mg Oral Cari Velazquez RN    11/15/2020 2144 Given 1,200 mg Oral Lizzy Dahl RN      hydrocodone-homatropine (HYDROMET) 5-1.5 MG/5ML syrup 5 mL     Date Action Dose Route User    11/15/2020 6618 Given 5 mL Oral Mulle

## 2020-11-17 PROCEDURE — 99232 SBSQ HOSP IP/OBS MODERATE 35: CPT | Performed by: HOSPITALIST

## 2020-11-17 NOTE — PLAN OF CARE
Pt alert and oriented x4. VS: afebrile. Tele: SB. Pt denies N/V/D. Pt denies pain. Received pt on 5L NC sating at 95%. Pt was weaned down to 4L NC. Pt is currently maintaining O2 sats at 95% on 4L NC. IV decadron. IV remdesivir completed.  Pt voids appropri antiarrhythmic and heart rate control medications as ordered  - Initiate emergency measures for life threatening arrhythmias  - Monitor electrolytes and administer replacement therapy as ordered  Outcome: Progressing     Problem: RESPIRATORY - ADULT  Goal: appropriate  - Instruct patient on fluid and nutrition restrictions as appropriate  Outcome: Progressing

## 2020-11-17 NOTE — PAYOR COMM NOTE
--------------  CONTINUED STAY REVIEW    Payor: [facility]  Subscriber #:  [id number]  Authorization Number: [id number]      Admit date: 11/12/20  Admit time: 1659    Admitting Physician: Mirela Lau MD  Attending Physician:  Bryant Johnson TP 8.6* 7.3 7.1      Estimated Creatinine Clearance: 76.8 mL/min (based on SCr of 0.67 mg/dL).     No results for input(s): PTP, INR in the last 168 hours.            Recent Labs   Lab 11/12/20  1413 11/12/20  1800 11/12/20  2034 11/13/20  0052   TROP <0 40 mg Subcutaneous (Left Lower Abdomen) Sruthi Carpenter RN      gucaneloFENesin ER [facility]) 12 hr tab 1,200 mg     Date Action Dose Route User    11/17/2020 0952 Given 1,200 mg Oral Cayden Mcdowell RN    11/16/2020 2125 Given 1,200 mg Oral Sruthi Carpenter

## 2020-11-17 NOTE — PLAN OF CARE
Pt aox4. On 6L HF nasal cannula, will continue to wean O2. Received last dose of remdesivir. Still receiving decadron IV. Has been having a productive cough. Coughing and deep breathing. Tele, NSR. , VSS. lovenox prophylactic.  Up SBA but has been using

## 2020-11-18 PROCEDURE — 99233 SBSQ HOSP IP/OBS HIGH 50: CPT | Performed by: INTERNAL MEDICINE

## 2020-11-18 RX ORDER — FLUTICASONE PROPIONATE 50 MCG
1 SPRAY, SUSPENSION (ML) NASAL DAILY
Status: DISCONTINUED | OUTPATIENT
Start: 2020-11-18 | End: 2020-11-19

## 2020-11-18 NOTE — PLAN OF CARE
Pt A&Ox4,  on 3L O2 NC with spO2 >95%, tele NSR, VSS. Pt encouraged use of IS and proning. Afebrile. Pt with c/o pain in bilateral legs, MD notified, given PRN tylenol with relief. No c/o n/v. Pt voids, up with standby assist. IV decadron.  Regular diet, antiarrhythmic and heart rate control medications as ordered  - Initiate emergency measures for life threatening arrhythmias  - Monitor electrolytes and administer replacement therapy as ordered  Outcome: Progressing     Problem: RESPIRATORY - ADULT  Goal: appropriate  - Instruct patient on fluid and nutrition restrictions as appropriate  Outcome: Progressing

## 2020-11-18 NOTE — PROGRESS NOTES
[facility]                INFECTIOUS DISEASE PROGRESS NOTE    Elidia Saenzchfreddy Patient Status:  Inpatient    1965 MRN [id number]   [facility] 5NW-A Attending Jerrell Rodriguez,    Hosp Day # 5 PCP Christian Baxter MD     An 11/12/20  2:15 PM    Specimen: Blood,peripheral   Result Value Ref Range    Blood Culture Result No Growth 5 Days N/A         Radiology  cxr reviewed    Problem list reviewed:  Patient Active Problem List:     Pancreatic cyst     Hypokalemia     Leukocytos

## 2020-11-18 NOTE — PROGRESS NOTES
[facility] Patient Status:  Inpatient    1965 MRN [id number]   [facility] 5NW-A Attending Meera Rodarte MD   Hosp Day # 6 PCP Estephania Multani MD     SUBJECTIVE: doing much better.   Diarrhea almost gone COVID19 Detected (A) 11/10/2020    COVID19 Detected (A) 11/10/2020       Pro-Calcitonin  Recent Labs   Lab 11/12/20  1413 11/14/20  0441   PCT 0.05 0.06       Cardiac  Recent Labs   Lab 11/12/20  1413 11/12/20  1800 11/12/20  2034 11/13/20  0052   TROP <0.

## 2020-11-18 NOTE — PAYOR COMM NOTE
--------------  CONTINUED STAY REVIEW    Payor: [facility]  Subscriber #:  [id number]  Authorization Number: [id number]      Admit date: 11/12/20  Admit time: 1659    Admitting Physician: Lillian Caro MD  Attending Physician:  Carolina Hines 0.5 0.6   TP 7.3 7.1 6.7         No results found for: [facility]  Microbiology     Recent Results         Hospital Encounter on 11/12/20   1.  BLOOD CULTURE     Status: None     Collection Time: 11/12/20  2:15 PM     Specimen: Blood,peripheral   Result Value Re [facility]) 12 hr tab 1,200 mg     Date Action Dose Route User    11/18/2020 0858 Given 1,200 mg Oral Tanya Singh RN    11/17/2020 2001 Given 1,200 mg Oral Amado Wilde RN      Oxymetazoline HCl (NASAL DECONGESTANT SPRAY) 0.05 % nasal spray 1 spray

## 2020-11-18 NOTE — PLAN OF CARE
Pt a&ox4. Primarily [place] speaking. On 3L nc @ 95%. completed remdesivir. Decadron IV day 6. Has been having a productive cough. Coughing and deep breathing and using IS. Tele, NSR. SB during noc, asymptomatic. , VSS. lovenox prophylactic.  Up independ ordered  Outcome: Progressing     Problem: RESPIRATORY - ADULT  Goal: Achieves optimal ventilation and oxygenation  Description: INTERVENTIONS:  - Assess for changes in respiratory status  - Assess for changes in mentation and behavior  - Position to facil

## 2020-11-18 NOTE — PROGRESS NOTES
[facility]                INFECTIOUS DISEASE PROGRESS NOTE    Jacky Inman Patient Status:  Inpatient    1965 MRN [id number]   [facility] 5NW-A Attending Tyron Bey DO   Hosp Day # 6 PCP Celina Iverson MD     An Status: None    Collection Time: 11/12/20  2:15 PM    Specimen: Blood,peripheral   Result Value Ref Range    Blood Culture Result No Growth 5 Days N/A         Radiology  cxr reviewed    Problem list reviewed:  Patient Active Problem List:     URUKIKGMR

## 2020-11-18 NOTE — PROGRESS NOTES
[facility] H[facility]PITAL[facility]  Progress Note     Ernesto Be Patient Status:  Inpatient    1965 MRN [id number]   [facility] 5NW-A Attending Zion Calderón, [address] Day # 6 PCP Vicki العلي MD     Chief Complaint: SOB, cough    S: Pt mg/dL). No results for input(s): PTP, INR in the last 168 hours. Recent Labs   Lab 11/12/20  1413 11/12/20  1800 11/12/20 2034 11/13/20  0052   TROP <0.045 <0.045 <0.045 <0.045   CK 73  --   --   --      Imaging: Imaging data reviewed in Epic.     Me

## 2020-11-18 NOTE — ED NOTES
Unable to reach patient after multiple calls and messages left. Certified letter sent to patient home address listed in EMR regarding covid positive results and follow up instructions and return to ED if worsening symptoms.

## 2020-11-19 ENCOUNTER — TELEPHONE (OUTPATIENT)
Dept: INTERNAL MEDICINE | Age: 55
End: 2020-11-19

## 2020-11-19 VITALS
DIASTOLIC BLOOD PRESSURE: 60 MMHG | BODY MASS INDEX: 31.81 KG/M2 | OXYGEN SATURATION: 93 % | WEIGHT: 172.88 LBS | TEMPERATURE: 98 F | HEART RATE: 65 BPM | RESPIRATION RATE: 18 BRPM | HEIGHT: 62 IN | SYSTOLIC BLOOD PRESSURE: 108 MMHG

## 2020-11-19 PROCEDURE — 99239 HOSP IP/OBS DSCHRG MGMT >30: CPT | Performed by: INTERNAL MEDICINE

## 2020-11-19 NOTE — PAYOR COMM NOTE
--------------  CONTINUED STAY REVIEW    Payor: [address] [facility]  Subscriber #:  [id number]  Authorization Number: [id number]      Admit date: 11/12/20  Admit time: 1659    Admitting Physician: Marlene Campo MD  Attending Physician:  Marylene Beauvais Hosp Day # 7 PCP Siena Sterling MD      Pulm / Critical Care Progress Note     S: pt states that she is feeling much better.       Scheduled Medication:  • Fluticasone Propionate  1 spray Each Nare Daily   • guaiFENesin ER  1,200 mg Oral BID   • dexa K 4.4 4.0 3.6    109 108   CO2 25.0 25.0 26.0   BUN 42* 38* 36*          Creatinine (mg/dL)   Date Value   11/17/2020 0.55   11/16/2020 0.67   11/15/2020 0.73   ]           Recent Labs   Lab 11/15/20  1154 11/16/20  0800 11/17/20  0735   ALT 34 34 38

## 2020-11-19 NOTE — PLAN OF CARE
Patient A0x4. Received patient on 3L, have been able to wean to 1L; Baseline is room air. Dyspnea upon exertion. Is at bedside-500. Patient c/o sinus congestion- Flonase and nasal decongestant spray given. Vss, afebrile. Nsr/SB on tele. Lovenox.  Voids wit Assess for signs of decreased coronary artery perfusion - ex.  Angina  - Evaluate fluid balance, assess for edema, trend weights  Outcome: Progressing  Goal: Absence of cardiac arrhythmias or at baseline  Description: INTERVENTIONS:  - Continuous cardiac mo and symptoms of volume excess or deficit  - Monitor intake, output and patient weight  - Monitor urine specific gravity, serum osmolarity and serum sodium as indicated or ordered  - Monitor response to interventions for patient's volume status, including l

## 2020-11-19 NOTE — DIETARY NOTE
[facility]     Admitting diagnosis:  Hypoxia [R09.02]  Pneumonia due to COVID-19 virus [U07.1, J12.89]    Ht: 157.5 cm (5' 2\")  Wt: 78.4 kg (172 lb 14.4 oz). Body mass index is 31.62 kg/m².   IBW: 50 kg    La

## 2020-11-19 NOTE — PROGRESS NOTES
11/18/20 2128   Provider Notification   Reason for Communication Patient request  (Decongestant Medication. I.E. \"Stuffy Nose\". )   Provider Name Vadim Tony MD  [facility])   Method of Communication Page   Response Waiting for response   Notific

## 2020-11-19 NOTE — PROGRESS NOTES
[address] [place] [address] Patient Status:  Inpatient    1965 MRN [id number]   [facility] 5NW-A Attending Whit Wolfe MD   Hosp Day # 7 PCP Keila Sorto MD     Pulm / Critical Care Progress Note     S: pt states 11/15/20  1154 11/16/20  0800 11/17/20  0735    140 139   K 4.4 4.0 3.6    109 108   CO2 25.0 25.0 26.0   BUN 42* 38* 36*     Creatinine (mg/dL)   Date Value   11/17/2020 0.55   11/16/2020 0.67   11/15/2020 0.73   ]    Recent Labs   Lab 11/15/2

## 2020-11-19 NOTE — PROGRESS NOTES
SPO2% ON ROOM AIR AT REST 93-94%  SPO2% AMBULATION ON ROOM AIR 88%  SPO2% AMBULATION ON O2 92% ON 1 LITERS PER MINUTE

## 2020-11-19 NOTE — PROGRESS NOTES
[facility] HOSPITALIST  Progress Note     Miles Sissy Patient Status:  Inpatient    1965 MRN [id number]   [facility] 5NW-A Attending Jostin Parsons, [address] [facility] Day # 7 PCP Sanna Zamora MD     Chief Complaint: SOB, cough    S: Pt input(s): PTP, INR in the last 168 hours. Recent Labs   Lab 11/12/20  1413 11/12/20  1800 11/12/20 2034 11/13/20  0052   TROP <0.045 <0.045 <0.045 <0.045   CK 73  --   --   --      Imaging: Imaging data reviewed in Epic.     Medications:   • Fluticasone

## 2020-11-19 NOTE — PROGRESS NOTES
NURSING DISCHARGE NOTE    Discharged Home via Wheelchair. Accompanied by Support staff  Belongings Taken by patient/family. Pt educated on d/c information including medications, pulse ox usage, and covid information. Pt given pulse ox.  Pt educat

## 2020-11-20 ENCOUNTER — PATIENT OUTREACH (OUTPATIENT)
Dept: CASE MANAGEMENT | Age: 55
End: 2020-11-20

## 2020-11-20 NOTE — PROGRESS NOTES
Day 1 Home Monitoring. Patient has PCP through Dr. Anupam Red.      Closing home monitoring as patient has non [facility] provider.

## 2020-11-20 NOTE — DISCHARGE SUMMARY
[facility] H[facility]ITAL[facility]  DISCHARGE SUMMARY     Jose D Hardin Patient Status:  Inpatient    1965 MRN [id number]   [facility] 5NW-A Attending No att. providers found   Hosp Day # 7 PCP Ting Good MD     Date of Admission:  Refills: 0     PARoxetine HCl 20 MG Tabs  Commonly known as: PAXIL      Take 10 mg by mouth every morning. Refills: 0     zinc sulfate 220 (50 Zn) MG Caps  Commonly known as: ZINCATE      Take 220 mg by mouth 2 (two) times daily.    Refills: 0        STOP

## 2020-11-21 ENCOUNTER — APPOINTMENT (OUTPATIENT)
Dept: CT IMAGING | Age: 55
End: 2020-11-21
Attending: EMERGENCY MEDICINE
Payer: COMMERCIAL

## 2020-11-21 ENCOUNTER — HOSPITAL ENCOUNTER (EMERGENCY)
Age: 55
Discharge: HOME | End: 2020-11-21
Attending: EMERGENCY MEDICINE
Payer: COMMERCIAL

## 2020-11-21 VITALS
BODY MASS INDEX: 32.02 KG/M2 | TEMPERATURE: 98 F | WEIGHT: 174 LBS | HEIGHT: 62 IN | RESPIRATION RATE: 16 BRPM | SYSTOLIC BLOOD PRESSURE: 112 MMHG | OXYGEN SATURATION: 99 % | HEART RATE: 65 BPM | DIASTOLIC BLOOD PRESSURE: 72 MMHG

## 2020-11-21 DIAGNOSIS — U07.1 PNEUMONIA DUE TO COVID-19 VIRUS: Primary | ICD-10-CM

## 2020-11-21 DIAGNOSIS — R09.1 PLEURISY: ICD-10-CM

## 2020-11-21 DIAGNOSIS — J12.82 PNEUMONIA DUE TO COVID-19 VIRUS: Primary | ICD-10-CM

## 2020-11-21 DIAGNOSIS — E87.6 HYPOKALEMIA: ICD-10-CM

## 2020-11-21 PROCEDURE — 85730 THROMBOPLASTIN TIME PARTIAL: CPT | Performed by: EMERGENCY MEDICINE

## 2020-11-21 PROCEDURE — 71275 CT ANGIOGRAPHY CHEST: CPT | Performed by: EMERGENCY MEDICINE

## 2020-11-21 PROCEDURE — 96374 THER/PROPH/DIAG INJ IV PUSH: CPT

## 2020-11-21 PROCEDURE — 85025 COMPLETE CBC W/AUTO DIFF WBC: CPT | Performed by: EMERGENCY MEDICINE

## 2020-11-21 PROCEDURE — 84484 ASSAY OF TROPONIN QUANT: CPT | Performed by: EMERGENCY MEDICINE

## 2020-11-21 PROCEDURE — 96361 HYDRATE IV INFUSION ADD-ON: CPT

## 2020-11-21 PROCEDURE — 93005 ELECTROCARDIOGRAM TRACING: CPT

## 2020-11-21 PROCEDURE — 85610 PROTHROMBIN TIME: CPT | Performed by: EMERGENCY MEDICINE

## 2020-11-21 PROCEDURE — 93010 ELECTROCARDIOGRAM REPORT: CPT

## 2020-11-21 PROCEDURE — 99285 EMERGENCY DEPT VISIT HI MDM: CPT

## 2020-11-21 PROCEDURE — 99284 EMERGENCY DEPT VISIT MOD MDM: CPT

## 2020-11-21 PROCEDURE — 80053 COMPREHEN METABOLIC PANEL: CPT | Performed by: EMERGENCY MEDICINE

## 2020-11-21 RX ORDER — SODIUM CHLORIDE 9 MG/ML
INJECTION, SOLUTION INTRAVENOUS ONCE
Status: COMPLETED | OUTPATIENT
Start: 2020-11-21 | End: 2020-11-21

## 2020-11-21 RX ORDER — IBUPROFEN 600 MG/1
600 TABLET ORAL EVERY 8 HOURS PRN
Qty: 15 TABLET | Refills: 0 | Status: SHIPPED | OUTPATIENT
Start: 2020-11-21

## 2020-11-21 RX ORDER — POTASSIUM CHLORIDE 20 MEQ/1
40 TABLET, EXTENDED RELEASE ORAL ONCE
Status: COMPLETED | OUTPATIENT
Start: 2020-11-21 | End: 2020-11-21

## 2020-11-21 RX ORDER — POTASSIUM CHLORIDE 1.5 G/1.77G
20 POWDER, FOR SOLUTION ORAL DAILY
Qty: 3 PACKET | Refills: 0 | Status: SHIPPED | OUTPATIENT
Start: 2020-11-21 | End: 2020-11-24

## 2020-11-21 RX ORDER — KETOROLAC TROMETHAMINE 30 MG/ML
30 INJECTION, SOLUTION INTRAMUSCULAR; INTRAVENOUS ONCE
Status: COMPLETED | OUTPATIENT
Start: 2020-11-21 | End: 2020-11-21

## 2020-11-21 NOTE — ED PROVIDER NOTES
Patient Seen in: THE [facility] Emergency Department In [place]      History   Patient presents with:  Lung Problem    Stated Complaint: WAS D/C [address].   REPORTS SINCE YESTERDAY WITH LOWER UYEN*    HPI    Patient is a 61-year-old female No    Drug use: No             Review of Systems    Positive for stated complaint: WAS D/C [address]. REPORTS SINCE YESTERDAY WITH LOWER UYEN*  Other systems are as noted in HPI. Constitutional and vital signs reviewed.       All other all questions appropriately.         ED Course     Labs Reviewed   COMP METABOLIC PANEL (14) - Abnormal; Notable for the following components:       Result Value    Glucose 120 (*)     Potassium 3.0 (*)     BUN 28 (*)     BUN/CREA Ratio 30.4 (*)     Calcula recommended. Dictated by (CST): Frederick Avitia DO on 11/21/2020 at 3:37 PM     Finalized by (CST): Frederick Avitia DO on 11/21/2020 at 3:42 PM                   MDM      16:40 patient sitting back and breathing easily in no apparent distress.   Patient with Patient instructed to return to the emergency room symptoms worsen or if any other problems arise. Patient oxygen saturation remained normal throughout the ER stay. Patient discharged home as per her request at this time.                   Disposition and

## 2020-11-23 ENCOUNTER — PATIENT OUTREACH (OUTPATIENT)
Dept: CASE MANAGEMENT | Age: 55
End: 2020-11-23

## 2020-11-23 NOTE — PROGRESS NOTES
Da 1 Home Monitoring    Patient has PCP through [facility], DR. Nathaly FOLEY. [facility] Monitoring has patient has non [facility] provider.

## 2020-11-30 ENCOUNTER — V-VISIT (OUTPATIENT)
Dept: INTERNAL MEDICINE | Age: 55
End: 2020-11-30

## 2020-11-30 DIAGNOSIS — E11.8 DM (DIABETES MELLITUS), TYPE 2 WITH COMPLICATIONS (CMD): ICD-10-CM

## 2020-11-30 DIAGNOSIS — U07.1 PNEUMONIA DUE TO COVID-19 VIRUS: Primary | ICD-10-CM

## 2020-11-30 DIAGNOSIS — E78.5 HYPERLIPIDEMIA, UNSPECIFIED HYPERLIPIDEMIA TYPE: ICD-10-CM

## 2020-11-30 DIAGNOSIS — J12.82 PNEUMONIA DUE TO COVID-19 VIRUS: Primary | ICD-10-CM

## 2020-11-30 DIAGNOSIS — I10 ESSENTIAL HYPERTENSION: ICD-10-CM

## 2020-11-30 PROCEDURE — 99214 OFFICE O/P EST MOD 30 MIN: CPT | Performed by: FAMILY MEDICINE

## 2020-12-01 NOTE — PAYOR COMM NOTE
--------------  WE ARE STILL AWAITING YOUR DETERMINATION ON THIS INPT ADMISSION.     PLEASE FAX INPT DAYS AUTHORIZED ASAP -[phone]    [facility] YOU!    DISCHARGE REVIEW    Payor: [address]  Subscriber #:  [id number]  Authorization Number: [id number]

## 2020-12-08 ENCOUNTER — TELEPHONE (OUTPATIENT)
Dept: INTERNAL MEDICINE | Age: 55
End: 2020-12-08

## 2020-12-22 ENCOUNTER — LAB SERVICES (OUTPATIENT)
Dept: LAB | Age: 55
End: 2020-12-22

## 2020-12-22 ENCOUNTER — OFFICE VISIT (OUTPATIENT)
Dept: INTERNAL MEDICINE | Age: 55
End: 2020-12-22

## 2020-12-22 VITALS
SYSTOLIC BLOOD PRESSURE: 120 MMHG | HEIGHT: 62 IN | OXYGEN SATURATION: 98 % | DIASTOLIC BLOOD PRESSURE: 80 MMHG | WEIGHT: 175 LBS | TEMPERATURE: 98 F | HEART RATE: 84 BPM | BODY MASS INDEX: 32.2 KG/M2

## 2020-12-22 DIAGNOSIS — J12.82 PNEUMONIA DUE TO COVID-19 VIRUS: Primary | ICD-10-CM

## 2020-12-22 DIAGNOSIS — F32.1 MODERATE MAJOR DEPRESSION (CMD): ICD-10-CM

## 2020-12-22 DIAGNOSIS — R35.0 FREQUENCY OF MICTURITION: ICD-10-CM

## 2020-12-22 DIAGNOSIS — E55.9 VITAMIN D DEFICIENCY, UNSPECIFIED: ICD-10-CM

## 2020-12-22 DIAGNOSIS — I10 ESSENTIAL (PRIMARY) HYPERTENSION: ICD-10-CM

## 2020-12-22 DIAGNOSIS — R35.0 INCREASED URINARY FREQUENCY: ICD-10-CM

## 2020-12-22 DIAGNOSIS — E78.5 HYPERLIPIDEMIA, UNSPECIFIED: ICD-10-CM

## 2020-12-22 DIAGNOSIS — E78.5 HYPERLIPIDEMIA, UNSPECIFIED HYPERLIPIDEMIA TYPE: ICD-10-CM

## 2020-12-22 DIAGNOSIS — E55.9 VITAMIN D DEFICIENCY: ICD-10-CM

## 2020-12-22 DIAGNOSIS — U07.1 PNEUMONIA DUE TO COVID-19 VIRUS: Primary | ICD-10-CM

## 2020-12-22 DIAGNOSIS — I10 ESSENTIAL HYPERTENSION: ICD-10-CM

## 2020-12-22 DIAGNOSIS — E11.8 DM (DIABETES MELLITUS), TYPE 2 WITH COMPLICATIONS (CMD): ICD-10-CM

## 2020-12-22 DIAGNOSIS — J45.30 MILD PERSISTENT ASTHMA WITHOUT COMPLICATION: ICD-10-CM

## 2020-12-22 DIAGNOSIS — E11.8 TYPE 2 DIABETES MELLITUS WITH UNSPECIFIED COMPLICATIONS (CMD): ICD-10-CM

## 2020-12-22 PROBLEM — R73.9 HYPERGLYCEMIA: Status: RESOLVED | Noted: 2018-09-03 | Resolved: 2020-12-22

## 2020-12-22 LAB
25(OH)D3 SERPL-MCNC: 31.7 NG/ML (ref 30–100)
ALBUMIN SERPL-MCNC: 4.4 G/DL (ref 3.6–5.1)
ALP SERPL-CCNC: 77 U/L (ref 45–130)
ALT SERPL W/O P-5'-P-CCNC: 51 U/L (ref 4–38)
AST SERPL-CCNC: 34 U/L (ref 14–43)
BILIRUB SERPL-MCNC: 1 MG/DL (ref 0–1.3)
BUN SERPL-MCNC: 13 MG/DL (ref 7–20)
CALCIUM SERPL-MCNC: 9.5 MG/DL (ref 8.6–10.6)
CHLORIDE SERPL-SCNC: 105 MMOL/L (ref 96–107)
CHOLEST SERPL-MCNC: 209 MG/DL (ref 140–200)
CO2 SERPL-SCNC: 27 MMOL/L (ref 22–32)
CREAT SERPL-MCNC: 0.6 MG/DL (ref 0.5–1.4)
EST. AVERAGE GLUCOSE BLD GHB EST-MCNC: 119 MG/DL (ref 0–154)
GFR SERPL CREATININE-BSD FRML MDRD: >60 ML/MIN/{1.73M2}
GFR SERPL CREATININE-BSD FRML MDRD: >60 ML/MIN/{1.73M2}
GLUCOSE P FAST SERPL-MCNC: 117 MG/DL (ref 60–100)
HBA1C MFR BLD: 5.8 % (ref 4.2–6)
HDLC SERPL-MCNC: 45 MG/DL
LDLC SERPL CALC-MCNC: 128 MG/DL (ref 30–100)
POTASSIUM SERPL-SCNC: 4.5 MMOL/L (ref 3.5–5.3)
PROT SERPL-MCNC: 7.3 G/DL (ref 6.4–8.5)
SODIUM SERPL-SCNC: 139 MMOL/L (ref 136–146)
TRIGL SERPL-MCNC: 180 MG/DL (ref 0–200)

## 2020-12-22 PROCEDURE — 36415 COLL VENOUS BLD VENIPUNCTURE: CPT | Performed by: FAMILY MEDICINE

## 2020-12-22 PROCEDURE — 3074F SYST BP LT 130 MM HG: CPT | Performed by: FAMILY MEDICINE

## 2020-12-22 PROCEDURE — 83036 HEMOGLOBIN GLYCOSYLATED A1C: CPT | Performed by: FAMILY MEDICINE

## 2020-12-22 PROCEDURE — 3079F DIAST BP 80-89 MM HG: CPT | Performed by: FAMILY MEDICINE

## 2020-12-22 PROCEDURE — 82306 VITAMIN D 25 HYDROXY: CPT | Performed by: FAMILY MEDICINE

## 2020-12-22 PROCEDURE — 80053 COMPREHEN METABOLIC PANEL: CPT | Performed by: FAMILY MEDICINE

## 2020-12-22 PROCEDURE — 80061 LIPID PANEL: CPT | Performed by: FAMILY MEDICINE

## 2020-12-22 PROCEDURE — 99214 OFFICE O/P EST MOD 30 MIN: CPT | Performed by: FAMILY MEDICINE

## 2020-12-23 ENCOUNTER — TELEPHONE (OUTPATIENT)
Dept: INTERNAL MEDICINE | Age: 55
End: 2020-12-23

## 2020-12-23 DIAGNOSIS — I10 ESSENTIAL HYPERTENSION: Primary | ICD-10-CM

## 2020-12-23 DIAGNOSIS — E11.8 DM (DIABETES MELLITUS), TYPE 2 WITH COMPLICATIONS (CMD): ICD-10-CM

## 2020-12-23 DIAGNOSIS — E78.5 HYPERLIPIDEMIA, UNSPECIFIED HYPERLIPIDEMIA TYPE: ICD-10-CM

## 2020-12-23 RX ORDER — ATORVASTATIN CALCIUM 40 MG/1
40 TABLET, FILM COATED ORAL DAILY
Qty: 90 TABLET | Refills: 3 | Status: SHIPPED | OUTPATIENT
Start: 2020-12-23 | End: 2022-01-17 | Stop reason: SDUPTHER

## 2021-01-08 ENCOUNTER — LAB SERVICES (OUTPATIENT)
Dept: LAB | Age: 56
End: 2021-01-08

## 2021-01-08 DIAGNOSIS — E11.8 TYPE 2 DIABETES MELLITUS WITH UNSPECIFIED COMPLICATIONS (CMD): ICD-10-CM

## 2021-01-08 DIAGNOSIS — R35.0 INCREASED URINARY FREQUENCY: ICD-10-CM

## 2021-01-08 DIAGNOSIS — E55.9 VITAMIN D DEFICIENCY: ICD-10-CM

## 2021-01-08 DIAGNOSIS — E78.5 HYPERLIPIDEMIA, UNSPECIFIED: ICD-10-CM

## 2021-01-08 DIAGNOSIS — E78.5 HYPERLIPIDEMIA, UNSPECIFIED HYPERLIPIDEMIA TYPE: ICD-10-CM

## 2021-01-08 DIAGNOSIS — I10 ESSENTIAL (PRIMARY) HYPERTENSION: ICD-10-CM

## 2021-01-08 DIAGNOSIS — E55.9 VITAMIN D DEFICIENCY, UNSPECIFIED: ICD-10-CM

## 2021-01-08 DIAGNOSIS — R35.0 FREQUENCY OF MICTURITION: ICD-10-CM

## 2021-01-08 DIAGNOSIS — E11.8 DM (DIABETES MELLITUS), TYPE 2 WITH COMPLICATIONS (CMD): ICD-10-CM

## 2021-01-08 DIAGNOSIS — I10 ESSENTIAL HYPERTENSION: ICD-10-CM

## 2021-01-08 LAB
BACTERIA: ABNORMAL
BILIRUBIN URINE: NEGATIVE
BLOOD URINE: NEGATIVE
CLARITY: ABNORMAL
COLOR: YELLOW
GLUCOSE QUALITATIVE U: NEGATIVE
HYALINE CAST: ABNORMAL
KETONES, URINE: NEGATIVE
LEUKOCYTE ESTERASE URINE: ABNORMAL
MUCOUS: ABNORMAL
NITRITE URINE: NEGATIVE
PH URINE: 5 (ref 5–7)
RED BLOOD CELLS URINE: ABNORMAL (ref 0–3)
SPECIFIC GRAVITY URINE: 1.02 (ref 1–1.03)
SQUAMOUS EPITHELIAL CELLS: ABNORMAL
U CALCIUM OXALATE CRYSTALS: ABNORMAL
URINE PROTEIN, QUAL (DIPSTICK): NEGATIVE
UROBILINOGEN URINE: <2
WHITE BLOOD CELLS URINE: ABNORMAL (ref 0–5)

## 2021-01-08 PROCEDURE — 87086 URINE CULTURE/COLONY COUNT: CPT | Performed by: FAMILY MEDICINE

## 2021-01-08 PROCEDURE — 81003 URINALYSIS AUTO W/O SCOPE: CPT | Performed by: FAMILY MEDICINE

## 2021-01-09 LAB — FINAL REPORT: NORMAL

## 2021-01-11 DIAGNOSIS — F34.1 DYSTHYMIA: ICD-10-CM

## 2021-01-11 RX ORDER — PAROXETINE HYDROCHLORIDE 20 MG/1
20 TABLET, FILM COATED ORAL EVERY MORNING
Qty: 90 TABLET | Refills: 1 | Status: SHIPPED | OUTPATIENT
Start: 2021-01-11 | End: 2022-01-17 | Stop reason: ALTCHOICE

## 2021-01-12 ENCOUNTER — TELEPHONE (OUTPATIENT)
Dept: INTERNAL MEDICINE | Age: 56
End: 2021-01-12

## 2021-01-12 DIAGNOSIS — R31.29 HEMATURIA, MICROSCOPIC: Primary | ICD-10-CM

## 2021-01-13 ENCOUNTER — TELEPHONE (OUTPATIENT)
Dept: INTERNAL MEDICINE | Age: 56
End: 2021-01-13

## 2021-01-14 ENCOUNTER — TELEPHONE (OUTPATIENT)
Dept: INTERNAL MEDICINE | Age: 56
End: 2021-01-14

## 2021-01-18 ENCOUNTER — TELEPHONE (OUTPATIENT)
Dept: UROLOGY | Age: 56
End: 2021-01-18

## 2021-02-04 ENCOUNTER — APPOINTMENT (OUTPATIENT)
Dept: INTERNAL MEDICINE | Age: 56
End: 2021-02-04

## 2021-02-23 ENCOUNTER — OFFICE VISIT (OUTPATIENT)
Dept: DERMATOLOGY | Age: 56
End: 2021-02-23

## 2021-02-23 ENCOUNTER — TELEPHONE (OUTPATIENT)
Dept: DERMATOLOGY | Age: 56
End: 2021-02-23

## 2021-02-23 DIAGNOSIS — L21.9 SEBORRHEIC DERMATITIS: ICD-10-CM

## 2021-02-23 DIAGNOSIS — L65.9 ALOPECIA: Primary | ICD-10-CM

## 2021-02-23 PROCEDURE — 99214 OFFICE O/P EST MOD 30 MIN: CPT | Performed by: DERMATOLOGY

## 2021-02-23 RX ORDER — CLOBETASOL PROPIONATE 0.46 MG/ML
SOLUTION TOPICAL
Qty: 50 ML | Refills: 3 | Status: SHIPPED | OUTPATIENT
Start: 2021-02-23 | End: 2023-04-18 | Stop reason: ALTCHOICE

## 2021-02-23 RX ORDER — KETOCONAZOLE 20 MG/ML
SHAMPOO TOPICAL
Qty: 120 ML | Refills: 0 | Status: SHIPPED | OUTPATIENT
Start: 2021-02-23 | End: 2022-01-17 | Stop reason: ALTCHOICE

## 2021-03-04 ENCOUNTER — TELEPHONE (OUTPATIENT)
Dept: INTERNAL MEDICINE | Age: 56
End: 2021-03-04

## 2021-03-04 DIAGNOSIS — R31.29 HEMATURIA, MICROSCOPIC: Primary | ICD-10-CM

## 2021-03-11 ENCOUNTER — EXTERNAL RECORD (OUTPATIENT)
Dept: HEALTH INFORMATION MANAGEMENT | Facility: OTHER | Age: 56
End: 2021-03-11

## 2021-03-11 DIAGNOSIS — R31.29 HEMATURIA, MICROSCOPIC: ICD-10-CM

## 2021-03-16 DIAGNOSIS — R93.5 ABNORMAL CT OF THE ABDOMEN: Primary | ICD-10-CM

## 2021-03-17 ENCOUNTER — TELEPHONE (OUTPATIENT)
Dept: INTERNAL MEDICINE | Age: 56
End: 2021-03-17

## 2021-03-18 ENCOUNTER — APPOINTMENT (OUTPATIENT)
Dept: CT IMAGING | Age: 56
End: 2021-03-18
Attending: FAMILY MEDICINE

## 2021-03-19 ENCOUNTER — OFFICE VISIT (OUTPATIENT)
Dept: UROLOGY | Age: 56
End: 2021-03-19

## 2021-03-19 VITALS — BODY MASS INDEX: 33.49 KG/M2 | HEIGHT: 62 IN | TEMPERATURE: 98.2 F | WEIGHT: 182 LBS

## 2021-03-19 DIAGNOSIS — N28.1 RENAL CYST: ICD-10-CM

## 2021-03-19 DIAGNOSIS — R31.29 MICROHEMATURIA: Primary | ICD-10-CM

## 2021-03-19 PROCEDURE — 99204 OFFICE O/P NEW MOD 45 MIN: CPT | Performed by: UROLOGY

## 2021-03-19 SDOH — HEALTH STABILITY: MENTAL HEALTH: HOW OFTEN DO YOU HAVE A DRINK CONTAINING ALCOHOL?: NEVER

## 2021-04-20 DIAGNOSIS — I10 ESSENTIAL HYPERTENSION: ICD-10-CM

## 2021-04-22 RX ORDER — TELMISARTAN 80 MG/1
80 TABLET ORAL DAILY
Qty: 90 TABLET | Refills: 1 | Status: SHIPPED | OUTPATIENT
Start: 2021-04-22 | End: 2022-01-17 | Stop reason: SDUPTHER

## 2021-04-26 ENCOUNTER — TELEPHONE (OUTPATIENT)
Dept: UROLOGY | Age: 56
End: 2021-04-26

## 2021-04-26 DIAGNOSIS — R10.9 ABDOMINAL CRAMPING: Primary | ICD-10-CM

## 2021-04-29 ENCOUNTER — APPOINTMENT (OUTPATIENT)
Dept: UROLOGY | Age: 56
End: 2021-04-29

## 2021-05-20 ENCOUNTER — LAB SERVICES (OUTPATIENT)
Dept: LAB | Age: 56
End: 2021-05-20

## 2021-05-20 DIAGNOSIS — R10.9 ABDOMINAL CRAMPING: ICD-10-CM

## 2021-05-20 LAB
BILIRUBIN URINE: NEGATIVE
BLOOD URINE: ABNORMAL
CLARITY: CLEAR
COLOR: COLORLESS
GLUCOSE QUALITATIVE U: NEGATIVE
KETONES, URINE: NEGATIVE
LEUKOCYTE ESTERASE URINE: NEGATIVE
NITRITE URINE: NEGATIVE
PH URINE: 7 (ref 5–7)
RED BLOOD CELLS URINE: 0 (ref 0–3)
SPECIFIC GRAVITY URINE: 1 (ref 1–1.03)
SQUAMOUS EPITHELIAL CELLS: NORMAL
URINE PROTEIN, QUAL (DIPSTICK): NEGATIVE
UROBILINOGEN URINE: <2
WHITE BLOOD CELLS URINE: NORMAL (ref 0–5)

## 2021-05-20 PROCEDURE — 81003 URINALYSIS AUTO W/O SCOPE: CPT | Performed by: UROLOGY

## 2021-05-25 ENCOUNTER — TELEPHONE (OUTPATIENT)
Dept: UROLOGY | Age: 56
End: 2021-05-25

## 2021-05-26 ENCOUNTER — OFFICE VISIT (OUTPATIENT)
Dept: UROLOGY | Age: 56
End: 2021-05-26

## 2021-05-26 DIAGNOSIS — N39.0 RECURRENT UTI: ICD-10-CM

## 2021-05-26 DIAGNOSIS — R31.29 MICROHEMATURIA: Primary | ICD-10-CM

## 2021-05-26 PROCEDURE — 52281 CYSTOSCOPY AND TREATMENT: CPT | Performed by: UROLOGY

## 2021-05-26 RX ORDER — SULFAMETHOXAZOLE AND TRIMETHOPRIM 800; 160 MG/1; MG/1
TABLET ORAL
Qty: 14 TABLET | Refills: 0 | Status: SHIPPED | OUTPATIENT
Start: 2021-05-26 | End: 2021-06-11 | Stop reason: ALTCHOICE

## 2021-06-11 ENCOUNTER — OFFICE VISIT (OUTPATIENT)
Dept: INTERNAL MEDICINE | Age: 56
End: 2021-06-11

## 2021-06-11 ENCOUNTER — LAB SERVICES (OUTPATIENT)
Dept: LAB | Age: 56
End: 2021-06-11

## 2021-06-11 VITALS
OXYGEN SATURATION: 98 % | HEART RATE: 84 BPM | SYSTOLIC BLOOD PRESSURE: 126 MMHG | TEMPERATURE: 97.9 F | WEIGHT: 182 LBS | DIASTOLIC BLOOD PRESSURE: 84 MMHG | HEIGHT: 62 IN | BODY MASS INDEX: 33.49 KG/M2

## 2021-06-11 DIAGNOSIS — E78.5 HYPERLIPIDEMIA, UNSPECIFIED HYPERLIPIDEMIA TYPE: ICD-10-CM

## 2021-06-11 DIAGNOSIS — R30.0 DYSURIA: ICD-10-CM

## 2021-06-11 DIAGNOSIS — I10 ESSENTIAL HYPERTENSION: ICD-10-CM

## 2021-06-11 DIAGNOSIS — M79.604 BILATERAL LEG PAIN: Primary | ICD-10-CM

## 2021-06-11 DIAGNOSIS — M79.605 BILATERAL LEG PAIN: Primary | ICD-10-CM

## 2021-06-11 DIAGNOSIS — F32.1 MODERATE MAJOR DEPRESSION (CMD): ICD-10-CM

## 2021-06-11 DIAGNOSIS — R30.0 DYSURIA: Primary | ICD-10-CM

## 2021-06-11 PROBLEM — N13.2 URETERAL STONE WITH HYDRONEPHROSIS: Status: RESOLVED | Noted: 2018-09-03 | Resolved: 2021-06-11

## 2021-06-11 PROBLEM — R79.89 AZOTEMIA: Status: RESOLVED | Noted: 2018-09-03 | Resolved: 2021-06-11

## 2021-06-11 PROBLEM — E87.6 HYPOKALEMIA: Status: RESOLVED | Noted: 2018-09-03 | Resolved: 2021-06-11

## 2021-06-11 PROBLEM — N12 PYELONEPHRITIS: Status: RESOLVED | Noted: 2018-09-04 | Resolved: 2021-06-11

## 2021-06-11 PROCEDURE — 99214 OFFICE O/P EST MOD 30 MIN: CPT | Performed by: FAMILY MEDICINE

## 2021-06-11 PROCEDURE — 3074F SYST BP LT 130 MM HG: CPT | Performed by: FAMILY MEDICINE

## 2021-06-11 PROCEDURE — 3079F DIAST BP 80-89 MM HG: CPT | Performed by: FAMILY MEDICINE

## 2021-06-11 PROCEDURE — 96127 BRIEF EMOTIONAL/BEHAV ASSMT: CPT | Performed by: FAMILY MEDICINE

## 2021-06-11 RX ORDER — DULOXETIN HYDROCHLORIDE 20 MG/1
20 CAPSULE, DELAYED RELEASE ORAL DAILY
Qty: 30 CAPSULE | Refills: 1 | Status: SHIPPED | OUTPATIENT
Start: 2021-06-11 | End: 2022-01-17 | Stop reason: SDUPTHER

## 2021-06-11 ASSESSMENT — PATIENT HEALTH QUESTIONNAIRE - PHQ9
SUM OF ALL RESPONSES TO PHQ QUESTIONS 1-9: 15
CLINICAL INTERPRETATION OF PHQ9 SCORE: FURTHER SCREENING NEEDED
7. TROUBLE CONCENTRATING ON THINGS, SUCH AS READING THE NEWSPAPER OR WATCHING TELEVISION: MORE THAN HALF THE DAYS
4. FEELING TIRED OR HAVING LITTLE ENERGY: MORE THAN HALF THE DAYS
CLINICAL INTERPRETATION OF PHQ2 SCORE: FURTHER SCREENING NEEDED
CLINICAL INTERPRETATION OF PHQ9 SCORE: MODERATELY SEVERE DEPRESSION
6. FEELING BAD ABOUT YOURSELF - OR THAT YOU ARE A FAILURE OR HAVE LET YOURSELF OR YOUR FAMILY DOWN: MORE THAN HALF THE DAYS
5. POOR APPETITE, WEIGHT LOSS, OR OVEREATING: MORE THAN HALF THE DAYS
CLINICAL INTERPRETATION OF PHQ2 SCORE: MODERATELY SEVERE DEPRESSION
2. FEELING DOWN, DEPRESSED OR HOPELESS: MORE THAN HALF THE DAYS
3. TROUBLE FALLING OR STAYING ASLEEP OR SLEEPING TOO MUCH: MORE THAN HALF THE DAYS
SUM OF ALL RESPONSES TO PHQ9 QUESTIONS 1 AND 2: 4
SUM OF ALL RESPONSES TO PHQ9 QUESTIONS 1 AND 2: 4
9. THOUGHTS THAT YOU WOULD BE BETTER OFF DEAD, OR OF HURTING YOURSELF: NOT AT ALL
8. MOVING OR SPEAKING SO SLOWLY THAT OTHER PEOPLE COULD HAVE NOTICED. OR THE OPPOSITE, BEING SO FIGETY OR RESTLESS THAT YOU HAVE BEEN MOVING AROUND A LOT MORE THAN USUAL: SEVERAL DAYS
1. LITTLE INTEREST OR PLEASURE IN DOING THINGS: MORE THAN HALF THE DAYS
SUM OF ALL RESPONSES TO PHQ9 QUESTIONS 1 TO 9: 15
10. IF YOU CHECKED OFF ANY PROBLEMS, HOW DIFFICULT HAVE THESE PROBLEMS MADE IT FOR YOU TO DO YOUR WORK, TAKE CARE OF THINGS AT HOME, OR GET ALONG WITH OTHER PEOPLE: SOMEWHAT DIFFICULT

## 2021-06-22 ENCOUNTER — LAB SERVICES (OUTPATIENT)
Dept: LAB | Age: 56
End: 2021-06-22

## 2021-06-22 DIAGNOSIS — R30.0 DYSURIA: ICD-10-CM

## 2021-06-22 PROCEDURE — 81003 URINALYSIS AUTO W/O SCOPE: CPT | Performed by: FAMILY MEDICINE

## 2021-07-05 ENCOUNTER — OFFICE VISIT (OUTPATIENT)
Dept: OBGYN | Age: 56
End: 2021-07-05

## 2021-07-05 VITALS
BODY MASS INDEX: 33.84 KG/M2 | HEART RATE: 70 BPM | RESPIRATION RATE: 18 BRPM | HEIGHT: 61 IN | DIASTOLIC BLOOD PRESSURE: 74 MMHG | SYSTOLIC BLOOD PRESSURE: 126 MMHG | TEMPERATURE: 97.2 F | WEIGHT: 179.25 LBS

## 2021-07-05 DIAGNOSIS — Z12.31 ENCOUNTER FOR SCREENING MAMMOGRAM FOR MALIGNANT NEOPLASM OF BREAST: Primary | ICD-10-CM

## 2021-07-05 DIAGNOSIS — N83.209 CYST OF OVARY, UNSPECIFIED LATERALITY: ICD-10-CM

## 2021-07-05 DIAGNOSIS — Z12.11 COLON CANCER SCREENING: ICD-10-CM

## 2021-07-05 DIAGNOSIS — Z01.419 ENCOUNTER FOR GYNECOLOGICAL EXAMINATION WITHOUT ABNORMAL FINDING: ICD-10-CM

## 2021-07-05 DIAGNOSIS — N89.8 VAGINAL DISCHARGE: ICD-10-CM

## 2021-07-05 PROCEDURE — 3078F DIAST BP <80 MM HG: CPT | Performed by: OBSTETRICS & GYNECOLOGY

## 2021-07-05 PROCEDURE — 81514 NFCT DS BV&VAGINITIS DNA ALG: CPT | Performed by: OBSTETRICS & GYNECOLOGY

## 2021-07-05 PROCEDURE — 3074F SYST BP LT 130 MM HG: CPT | Performed by: OBSTETRICS & GYNECOLOGY

## 2021-07-05 PROCEDURE — 99386 PREV VISIT NEW AGE 40-64: CPT | Performed by: OBSTETRICS & GYNECOLOGY

## 2021-07-06 LAB
BV BACTERIA DNA VAG QL NAA+PROBE: NEGATIVE
C GLABRATA DNA VAG QL NAA+PROBE: POSITIVE
C KRUSEI DNA VAG QL NAA+PROBE: NEGATIVE
CANDIDA DNA VAG QL NAA+PROBE: NEGATIVE
T VAGINALIS DNA GENITAL QL NAA+PROBE: NEGATIVE

## 2021-07-21 ENCOUNTER — TELEPHONE (OUTPATIENT)
Dept: PAIN MANAGEMENT | Age: 56
End: 2021-07-21

## 2021-07-26 RX ORDER — FLUCONAZOLE 200 MG/1
200 TABLET ORAL DAILY
Qty: 2 TABLET | Refills: 0 | Status: SHIPPED | OUTPATIENT
Start: 2021-07-26 | End: 2022-01-17 | Stop reason: ALTCHOICE

## 2021-07-28 ENCOUNTER — IMAGING SERVICES (OUTPATIENT)
Dept: ULTRASOUND IMAGING | Age: 56
End: 2021-07-28
Attending: OBSTETRICS & GYNECOLOGY

## 2021-07-28 DIAGNOSIS — N83.209 CYST OF OVARY, UNSPECIFIED LATERALITY: ICD-10-CM

## 2021-07-28 PROCEDURE — 76856 US EXAM PELVIC COMPLETE: CPT | Performed by: RADIOLOGY

## 2021-07-28 PROCEDURE — 76830 TRANSVAGINAL US NON-OB: CPT | Performed by: RADIOLOGY

## 2021-07-30 DIAGNOSIS — N83.201 RIGHT OVARIAN CYST: Primary | ICD-10-CM

## 2021-08-02 ENCOUNTER — LAB SERVICES (OUTPATIENT)
Dept: LAB | Age: 56
End: 2021-08-02

## 2021-08-02 ENCOUNTER — OFFICE VISIT (OUTPATIENT)
Dept: OBGYN | Age: 56
End: 2021-08-02

## 2021-08-02 VITALS
WEIGHT: 179 LBS | BODY MASS INDEX: 32.94 KG/M2 | SYSTOLIC BLOOD PRESSURE: 128 MMHG | HEART RATE: 70 BPM | DIASTOLIC BLOOD PRESSURE: 82 MMHG | TEMPERATURE: 97.2 F | HEIGHT: 62 IN | RESPIRATION RATE: 18 BRPM

## 2021-08-02 DIAGNOSIS — N83.201 RIGHT OVARIAN CYST: Primary | ICD-10-CM

## 2021-08-02 DIAGNOSIS — N83.201 RIGHT OVARIAN CYST: ICD-10-CM

## 2021-08-02 PROCEDURE — 3074F SYST BP LT 130 MM HG: CPT | Performed by: OBSTETRICS & GYNECOLOGY

## 2021-08-02 PROCEDURE — 36415 COLL VENOUS BLD VENIPUNCTURE: CPT | Performed by: OBSTETRICS & GYNECOLOGY

## 2021-08-02 PROCEDURE — 86304 IMMUNOASSAY TUMOR CA 125: CPT | Performed by: OBSTETRICS & GYNECOLOGY

## 2021-08-02 PROCEDURE — PSEU8182 CA 125: Performed by: CLINICAL MEDICAL LABORATORY

## 2021-08-02 PROCEDURE — 99214 OFFICE O/P EST MOD 30 MIN: CPT | Performed by: OBSTETRICS & GYNECOLOGY

## 2021-08-02 PROCEDURE — 3079F DIAST BP 80-89 MM HG: CPT | Performed by: OBSTETRICS & GYNECOLOGY

## 2021-08-02 PROCEDURE — 86304 IMMUNOASSAY TUMOR CA 125: CPT | Performed by: CLINICAL MEDICAL LABORATORY

## 2021-08-03 ENCOUNTER — LAB REQUISITION (OUTPATIENT)
Dept: LAB | Age: 56
End: 2021-08-03

## 2021-08-03 DIAGNOSIS — N83.201 UNSPECIFIED OVARIAN CYST, RIGHT SIDE: ICD-10-CM

## 2021-08-03 LAB
CANCER AG125 SERPL-ACNC: 14 UNITS/ML (ref 0–35)
CANCER AG125 SERPL-ACNC: 14 UNITS/ML (ref 0–35)

## 2021-08-16 ENCOUNTER — HOSPITAL ENCOUNTER (EMERGENCY)
Age: 56
Discharge: HOME | End: 2021-08-16
Attending: EMERGENCY MEDICINE
Payer: COMMERCIAL

## 2021-08-16 ENCOUNTER — APPOINTMENT (OUTPATIENT)
Dept: GENERAL RADIOLOGY | Age: 56
End: 2021-08-16
Attending: PHYSICIAN ASSISTANT
Payer: COMMERCIAL

## 2021-08-16 VITALS
RESPIRATION RATE: 20 BRPM | OXYGEN SATURATION: 96 % | HEIGHT: 62 IN | WEIGHT: 177 LBS | BODY MASS INDEX: 32.57 KG/M2 | HEART RATE: 85 BPM | SYSTOLIC BLOOD PRESSURE: 129 MMHG | DIASTOLIC BLOOD PRESSURE: 73 MMHG | TEMPERATURE: 98 F

## 2021-08-16 DIAGNOSIS — S90.31XA CONTUSION OF RIGHT FOOT, INITIAL ENCOUNTER: Primary | ICD-10-CM

## 2021-08-16 DIAGNOSIS — S91.311A LACERATION OF RIGHT FOOT, INITIAL ENCOUNTER: ICD-10-CM

## 2021-08-16 PROCEDURE — 73630 X-RAY EXAM OF FOOT: CPT | Performed by: PHYSICIAN ASSISTANT

## 2021-08-16 PROCEDURE — 64450 NJX AA&/STRD OTHER PN/BRANCH: CPT

## 2021-08-16 PROCEDURE — 99283 EMERGENCY DEPT VISIT LOW MDM: CPT

## 2021-08-16 RX ORDER — ACETAMINOPHEN 500 MG
1000 TABLET ORAL ONCE
Status: COMPLETED | OUTPATIENT
Start: 2021-08-16 | End: 2021-08-16

## 2021-08-16 RX ORDER — TELMISARTAN 80 MG/1
80 TABLET ORAL DAILY
COMMUNITY
Start: 2021-04-22

## 2021-08-16 RX ORDER — CEPHALEXIN 500 MG/1
500 CAPSULE ORAL 2 TIMES DAILY
Qty: 14 CAPSULE | Refills: 0 | Status: SHIPPED | OUTPATIENT
Start: 2021-08-16 | End: 2021-08-23

## 2021-08-16 NOTE — ED PROVIDER NOTES
Patient Seen in: THE [facility] Emergency Department In [address] [place] [address]      History   Patient presents with:  Leg or Foot Injury    Stated Complaint: pt reports dropping a knife on her right foot around 3pm. pain to area.      HPI/Subjective:   HPI    82-year-old fem (5' 2\")   Wt 80.3 kg   SpO2 96%   BMI 32.37 kg/m²         Physical Exam  Vitals and nursing note reviewed. Constitutional:       Appearance: She is well-developed. HENT:      Head: Atraumatic.       Right Ear: External ear normal.      Left Ear: Extern Finalized by (CST): Ramy Zamora MD on 8/16/2021 at 6:51 PM     -------------------------------------------------------    Procedure Name: local anesthesia  Indication: pain control  Location: right foot  Size: 0.5cm  Pre-Procedure Diagnosis: Laceration

## 2021-08-16 NOTE — ED INITIAL ASSESSMENT (HPI)
Pt states she dropped a clean kitchen knife and it went through her sock and punctured the top of her rt foot around 1500 today. Pt reports increased pain and swelling since.

## 2021-09-04 NOTE — CM/SW NOTE
11/19/20 1400   Discharge disposition   Expected discharge disposition Home or Self   Name of Arias BRAND Simmons 89 provider [facility]   Discharge transportation Private car   home o2 approved w/[facility].  Call to [facility] to deliver por
For discharge needs call Savage Delgadillo with [facility] at .
Order received for home O2.  [facility] referral sent to [facility] await approval.  Jose Young RN, 
no

## 2021-09-07 ENCOUNTER — OFFICE VISIT (OUTPATIENT)
Dept: GASTROENTEROLOGY | Age: 56
End: 2021-09-07

## 2021-09-07 VITALS — BODY MASS INDEX: 32.76 KG/M2 | HEIGHT: 62 IN | WEIGHT: 178 LBS

## 2021-09-07 DIAGNOSIS — K21.9 GASTROESOPHAGEAL REFLUX DISEASE, UNSPECIFIED WHETHER ESOPHAGITIS PRESENT: ICD-10-CM

## 2021-09-07 DIAGNOSIS — Z86.010 PERSONAL HISTORY OF COLONIC POLYPS: Primary | ICD-10-CM

## 2021-09-07 PROCEDURE — 99204 OFFICE O/P NEW MOD 45 MIN: CPT | Performed by: INTERNAL MEDICINE

## 2021-10-05 ENCOUNTER — TELEPHONE (OUTPATIENT)
Dept: INTERNAL MEDICINE | Age: 56
End: 2021-10-05

## 2021-10-05 DIAGNOSIS — R92.8 ABNORMAL MAMMOGRAM: Primary | ICD-10-CM

## 2021-10-07 ENCOUNTER — E-ADVICE (OUTPATIENT)
Dept: INTERNAL MEDICINE | Age: 56
End: 2021-10-07

## 2021-10-14 ENCOUNTER — TELEPHONE (OUTPATIENT)
Dept: GASTROENTEROLOGY | Age: 56
End: 2021-10-14

## 2021-10-14 DIAGNOSIS — Z01.812 ENCOUNTER FOR PREPROCEDURE SCREENING LABORATORY TESTING FOR COVID-19: Primary | ICD-10-CM

## 2021-10-14 DIAGNOSIS — Z11.52 ENCOUNTER FOR PREPROCEDURE SCREENING LABORATORY TESTING FOR COVID-19: Primary | ICD-10-CM

## 2021-10-19 ENCOUNTER — WALK IN (OUTPATIENT)
Dept: URGENT CARE | Age: 56
End: 2021-10-19

## 2021-10-19 ENCOUNTER — TELEPHONE (OUTPATIENT)
Dept: GASTROENTEROLOGY | Age: 56
End: 2021-10-19

## 2021-10-19 VITALS
DIASTOLIC BLOOD PRESSURE: 80 MMHG | TEMPERATURE: 97.9 F | RESPIRATION RATE: 18 BRPM | HEART RATE: 63 BPM | OXYGEN SATURATION: 100 % | SYSTOLIC BLOOD PRESSURE: 142 MMHG

## 2021-10-19 DIAGNOSIS — R31.9 HEMATURIA, UNSPECIFIED TYPE: Primary | ICD-10-CM

## 2021-10-19 LAB
BACTERIA: ABNORMAL
BILIRUBIN URINE: NEGATIVE
BLOOD URINE: ABNORMAL
CLARITY: ABNORMAL
COLOR: ABNORMAL
GLUCOSE QUALITATIVE U: NEGATIVE
KETONES, URINE: NEGATIVE
LEUKOCYTE ESTERASE URINE: NEGATIVE
MUCOUS: ABNORMAL
NITRITE URINE: NEGATIVE
PH URINE: 6 (ref 5–7)
RED BLOOD CELLS URINE: ABNORMAL (ref 0–3)
SPECIFIC GRAVITY URINE: 1.01 (ref 1–1.03)
SQUAMOUS EPITHELIAL CELLS: ABNORMAL
URINE PROTEIN, QUAL (DIPSTICK): NEGATIVE
UROBILINOGEN URINE: <2
WHITE BLOOD CELLS URINE: ABNORMAL (ref 0–5)

## 2021-10-19 PROCEDURE — 3077F SYST BP >= 140 MM HG: CPT | Performed by: FAMILY MEDICINE

## 2021-10-19 PROCEDURE — 99214 OFFICE O/P EST MOD 30 MIN: CPT | Performed by: FAMILY MEDICINE

## 2021-10-19 PROCEDURE — 81003 URINALYSIS AUTO W/O SCOPE: CPT | Performed by: FAMILY MEDICINE

## 2021-10-19 PROCEDURE — 3079F DIAST BP 80-89 MM HG: CPT | Performed by: FAMILY MEDICINE

## 2021-10-19 RX ORDER — ONDANSETRON 8 MG/1
8 TABLET, ORALLY DISINTEGRATING ORAL EVERY 8 HOURS PRN
Qty: 10 TABLET | Refills: 0 | Status: SHIPPED | OUTPATIENT
Start: 2021-10-19 | End: 2021-10-24

## 2021-10-22 ENCOUNTER — TELEPHONE (OUTPATIENT)
Dept: GASTROENTEROLOGY | Age: 56
End: 2021-10-22

## 2021-10-23 ENCOUNTER — LAB SERVICES (OUTPATIENT)
Dept: LAB | Age: 56
End: 2021-10-23

## 2021-10-23 DIAGNOSIS — Z11.52 ENCOUNTER FOR PREPROCEDURE SCREENING LABORATORY TESTING FOR COVID-19: ICD-10-CM

## 2021-10-23 DIAGNOSIS — Z01.812 ENCOUNTER FOR PREPROCEDURE SCREENING LABORATORY TESTING FOR COVID-19: ICD-10-CM

## 2021-10-23 LAB
SARS-COV-2 RNA RESP QL NAA+PROBE: NOT DETECTED
SERVICE CMNT-IMP: NORMAL
SERVICE CMNT-IMP: NORMAL

## 2021-10-23 PROCEDURE — U0003 INFECTIOUS AGENT DETECTION BY NUCLEIC ACID (DNA OR RNA); SEVERE ACUTE RESPIRATORY SYNDROME CORONAVIRUS 2 (SARS-COV-2) (CORONAVIRUS DISEASE [COVID-19]), AMPLIFIED PROBE TECHNIQUE, MAKING USE OF HIGH THROUGHPUT TECHNOLOGIES AS DESCRIBED BY CMS-2020-01-R: HCPCS | Performed by: INTERNAL MEDICINE

## 2021-10-23 PROCEDURE — U0005 INFEC AGEN DETEC AMPLI PROBE: HCPCS | Performed by: INTERNAL MEDICINE

## 2021-10-26 ENCOUNTER — ANESTHESIA EVENT (OUTPATIENT)
Dept: GASTROENTEROLOGY | Age: 56
End: 2021-10-26

## 2021-10-26 ENCOUNTER — ANESTHESIA (OUTPATIENT)
Dept: GASTROENTEROLOGY | Age: 56
End: 2021-10-26

## 2021-10-26 ENCOUNTER — HOSPITAL ENCOUNTER (OUTPATIENT)
Dept: GASTROENTEROLOGY | Age: 56
Discharge: HOME | End: 2021-10-26
Attending: INTERNAL MEDICINE

## 2021-10-26 VITALS
DIASTOLIC BLOOD PRESSURE: 70 MMHG | SYSTOLIC BLOOD PRESSURE: 124 MMHG | HEART RATE: 78 BPM | TEMPERATURE: 97.6 F | OXYGEN SATURATION: 100 % | RESPIRATION RATE: 15 BRPM

## 2021-10-26 DIAGNOSIS — R10.13 EPIGASTRIC PAIN: ICD-10-CM

## 2021-10-26 DIAGNOSIS — K31.7 POLYP OF STOMACH AND DUODENUM: ICD-10-CM

## 2021-10-26 DIAGNOSIS — K21.9 GASTRO-ESOPHAGEAL REFLUX DISEASE WITHOUT ESOPHAGITIS: ICD-10-CM

## 2021-10-26 DIAGNOSIS — K20.80 OTHER ESOPHAGITIS WITHOUT BLEEDING: ICD-10-CM

## 2021-10-26 DIAGNOSIS — Z86.010 PERSONAL HISTORY OF COLONIC POLYPS: ICD-10-CM

## 2021-10-26 DIAGNOSIS — K21.9 GASTROESOPHAGEAL REFLUX DISEASE, UNSPECIFIED WHETHER ESOPHAGITIS PRESENT: ICD-10-CM

## 2021-10-26 DIAGNOSIS — K31.89 OTHER DISEASES OF STOMACH AND DUODENUM: ICD-10-CM

## 2021-10-26 DIAGNOSIS — K63.5 POLYP OF COLON: ICD-10-CM

## 2021-10-26 DIAGNOSIS — K62.1 RECTAL POLYP: ICD-10-CM

## 2021-10-26 PROCEDURE — 88305 TISSUE EXAM BY PATHOLOGIST: CPT | Performed by: PATHOLOGY

## 2021-10-26 PROCEDURE — 43239 EGD BIOPSY SINGLE/MULTIPLE: CPT | Performed by: CLINIC/CENTER

## 2021-10-26 PROCEDURE — 45380 COLONOSCOPY AND BIOPSY: CPT | Performed by: INTERNAL MEDICINE

## 2021-10-26 PROCEDURE — 43239 EGD BIOPSY SINGLE/MULTIPLE: CPT | Performed by: INTERNAL MEDICINE

## 2021-10-26 PROCEDURE — 43251 EGD REMOVE LESION SNARE: CPT | Performed by: CLINIC/CENTER

## 2021-10-26 PROCEDURE — 43251 EGD REMOVE LESION SNARE: CPT | Performed by: INTERNAL MEDICINE

## 2021-10-26 PROCEDURE — 45380 COLONOSCOPY AND BIOPSY: CPT | Performed by: CLINIC/CENTER

## 2021-10-26 PROCEDURE — 45385 COLONOSCOPY W/LESION REMOVAL: CPT | Performed by: CLINIC/CENTER

## 2021-10-26 PROCEDURE — 45385 COLONOSCOPY W/LESION REMOVAL: CPT | Performed by: INTERNAL MEDICINE

## 2021-10-26 RX ORDER — PROPOFOL 10 MG/ML
INJECTION, EMULSION INTRAVENOUS PRN
Status: DISCONTINUED | OUTPATIENT
Start: 2021-10-26 | End: 2021-10-26

## 2021-10-26 RX ORDER — SODIUM CHLORIDE 9 MG/ML
INJECTION, SOLUTION INTRAVENOUS CONTINUOUS
Status: DISCONTINUED | OUTPATIENT
Start: 2021-10-26 | End: 2021-10-28 | Stop reason: HOSPADM

## 2021-10-26 RX ORDER — LIDOCAINE HYDROCHLORIDE 10 MG/ML
INJECTION, SOLUTION INFILTRATION; PERINEURAL PRN
Status: DISCONTINUED | OUTPATIENT
Start: 2021-10-26 | End: 2021-10-26

## 2021-10-26 RX ORDER — DEXTROSE MONOHYDRATE 50 MG/ML
INJECTION, SOLUTION INTRAVENOUS CONTINUOUS PRN
Status: DISCONTINUED | OUTPATIENT
Start: 2021-10-26 | End: 2021-10-28 | Stop reason: HOSPADM

## 2021-10-26 RX ORDER — SODIUM CHLORIDE, SODIUM LACTATE, POTASSIUM CHLORIDE, CALCIUM CHLORIDE 600; 310; 30; 20 MG/100ML; MG/100ML; MG/100ML; MG/100ML
INJECTION, SOLUTION INTRAVENOUS CONTINUOUS
Status: DISCONTINUED | OUTPATIENT
Start: 2021-10-26 | End: 2021-10-28 | Stop reason: HOSPADM

## 2021-10-26 RX ORDER — 0.9 % SODIUM CHLORIDE 0.9 %
2 VIAL (ML) INJECTION EVERY 12 HOURS SCHEDULED
Status: DISCONTINUED | OUTPATIENT
Start: 2021-10-26 | End: 2021-10-28 | Stop reason: HOSPADM

## 2021-10-26 RX ORDER — ONDANSETRON 2 MG/ML
4 INJECTION INTRAMUSCULAR; INTRAVENOUS 2 TIMES DAILY PRN
Status: DISCONTINUED | OUTPATIENT
Start: 2021-10-26 | End: 2021-10-28 | Stop reason: HOSPADM

## 2021-10-26 RX ORDER — LIDOCAINE HYDROCHLORIDE 10 MG/ML
5-10 INJECTION, SOLUTION INFILTRATION; PERINEURAL PRN
Status: DISCONTINUED | OUTPATIENT
Start: 2021-10-26 | End: 2021-10-28 | Stop reason: HOSPADM

## 2021-10-26 RX ADMIN — SODIUM CHLORIDE, SODIUM LACTATE, POTASSIUM CHLORIDE, CALCIUM CHLORIDE: 600; 310; 30; 20 INJECTION, SOLUTION INTRAVENOUS at 12:35

## 2021-10-26 RX ADMIN — PROPOFOL 50 MG: 10 INJECTION, EMULSION INTRAVENOUS at 13:23

## 2021-10-26 RX ADMIN — PROPOFOL 100 MG: 10 INJECTION, EMULSION INTRAVENOUS at 13:01

## 2021-10-26 RX ADMIN — PROPOFOL 50 MG: 10 INJECTION, EMULSION INTRAVENOUS at 13:07

## 2021-10-26 RX ADMIN — PROPOFOL 50 MG: 10 INJECTION, EMULSION INTRAVENOUS at 13:14

## 2021-10-26 RX ADMIN — LIDOCAINE HYDROCHLORIDE 50 MG: 10 INJECTION, SOLUTION INFILTRATION; PERINEURAL at 13:01

## 2021-10-26 RX ADMIN — PROPOFOL 50 MG: 10 INJECTION, EMULSION INTRAVENOUS at 13:18

## 2021-10-26 ASSESSMENT — ENCOUNTER SYMPTOMS: EXERCISE TOLERANCE: GOOD (>4 METS)

## 2021-10-26 ASSESSMENT — PAIN SCALES - GENERAL
PAINLEVEL_OUTOF10: 0
PAINLEVEL_OUTOF10: 0

## 2021-10-26 ASSESSMENT — ACTIVITIES OF DAILY LIVING (ADL)
ADL_SCORE: 12
ADL_BEFORE_ADMISSION: INDEPENDENT
NEEDS_ASSIST: NO

## 2021-10-27 ENCOUNTER — LAB REQUISITION (OUTPATIENT)
Dept: LAB | Age: 56
End: 2021-10-27

## 2021-10-27 DIAGNOSIS — Z86.010 PERSONAL HISTORY OF COLONIC POLYPS: ICD-10-CM

## 2021-10-27 DIAGNOSIS — R10.13 EPIGASTRIC PAIN: ICD-10-CM

## 2021-10-27 DIAGNOSIS — K21.9 GASTRO-ESOPHAGEAL REFLUX DISEASE WITHOUT ESOPHAGITIS: ICD-10-CM

## 2021-10-27 PROCEDURE — 88305 TISSUE EXAM BY PATHOLOGIST: CPT | Performed by: PATHOLOGY

## 2021-10-28 LAB
AP REPORT: NORMAL
COLONOSCOPY STUDY: NORMAL

## 2021-11-10 LAB
CASE RPRT: NORMAL
PATH REPORT.FINAL DX SPEC: NORMAL

## 2021-11-17 ENCOUNTER — IMAGING SERVICES (OUTPATIENT)
Dept: ULTRASOUND IMAGING | Age: 56
End: 2021-11-17
Attending: OBSTETRICS & GYNECOLOGY

## 2021-11-17 DIAGNOSIS — N83.201 RIGHT OVARIAN CYST: ICD-10-CM

## 2021-11-17 PROCEDURE — 76830 TRANSVAGINAL US NON-OB: CPT | Performed by: RADIOLOGY

## 2021-11-17 PROCEDURE — 76856 US EXAM PELVIC COMPLETE: CPT | Performed by: RADIOLOGY

## 2022-01-15 RX ORDER — ESCITALOPRAM OXALATE 10 MG/1
1 TABLET ORAL DAILY
COMMUNITY
Start: 2018-11-28 | End: 2022-01-17 | Stop reason: ALTCHOICE

## 2022-01-17 ENCOUNTER — OFFICE VISIT (OUTPATIENT)
Dept: INTERNAL MEDICINE | Age: 57
End: 2022-01-17

## 2022-01-17 VITALS
OXYGEN SATURATION: 98 % | BODY MASS INDEX: 31.83 KG/M2 | RESPIRATION RATE: 18 BRPM | SYSTOLIC BLOOD PRESSURE: 138 MMHG | DIASTOLIC BLOOD PRESSURE: 80 MMHG | TEMPERATURE: 97.4 F | HEIGHT: 62 IN | WEIGHT: 173 LBS | HEART RATE: 85 BPM

## 2022-01-17 DIAGNOSIS — I10 ESSENTIAL HYPERTENSION: Primary | ICD-10-CM

## 2022-01-17 DIAGNOSIS — F32.1 MODERATE MAJOR DEPRESSION (CMD): ICD-10-CM

## 2022-01-17 DIAGNOSIS — E78.5 HYPERLIPIDEMIA, UNSPECIFIED HYPERLIPIDEMIA TYPE: ICD-10-CM

## 2022-01-17 DIAGNOSIS — R30.0 DYSURIA: ICD-10-CM

## 2022-01-17 DIAGNOSIS — R73.01 IFG (IMPAIRED FASTING GLUCOSE): ICD-10-CM

## 2022-01-17 DIAGNOSIS — Z23 NEED FOR INFLUENZA VACCINATION: ICD-10-CM

## 2022-01-17 PROCEDURE — 99214 OFFICE O/P EST MOD 30 MIN: CPT | Performed by: FAMILY MEDICINE

## 2022-01-17 PROCEDURE — 90686 IIV4 VACC NO PRSV 0.5 ML IM: CPT | Performed by: FAMILY MEDICINE

## 2022-01-17 PROCEDURE — 3075F SYST BP GE 130 - 139MM HG: CPT | Performed by: FAMILY MEDICINE

## 2022-01-17 PROCEDURE — 3079F DIAST BP 80-89 MM HG: CPT | Performed by: FAMILY MEDICINE

## 2022-01-17 PROCEDURE — 96127 BRIEF EMOTIONAL/BEHAV ASSMT: CPT | Performed by: FAMILY MEDICINE

## 2022-01-17 PROCEDURE — 90471 IMMUNIZATION ADMIN: CPT | Performed by: FAMILY MEDICINE

## 2022-01-17 RX ORDER — ATORVASTATIN CALCIUM 40 MG/1
40 TABLET, FILM COATED ORAL DAILY
Qty: 90 TABLET | Refills: 3 | Status: SHIPPED | OUTPATIENT
Start: 2022-01-17 | End: 2023-01-13

## 2022-01-17 RX ORDER — DULOXETIN HYDROCHLORIDE 20 MG/1
20 CAPSULE, DELAYED RELEASE ORAL DAILY
Qty: 30 CAPSULE | Refills: 1 | Status: SHIPPED | OUTPATIENT
Start: 2022-01-17 | End: 2022-02-25 | Stop reason: ALTCHOICE

## 2022-01-17 RX ORDER — TELMISARTAN 80 MG/1
80 TABLET ORAL DAILY
Qty: 90 TABLET | Refills: 3 | Status: SHIPPED | OUTPATIENT
Start: 2022-01-17 | End: 2023-01-13

## 2022-01-17 ASSESSMENT — PATIENT HEALTH QUESTIONNAIRE - PHQ9
4. FEELING TIRED OR HAVING LITTLE ENERGY: MORE THAN HALF THE DAYS
10. IF YOU CHECKED OFF ANY PROBLEMS, HOW DIFFICULT HAVE THESE PROBLEMS MADE IT FOR YOU TO DO YOUR WORK, TAKE CARE OF THINGS AT HOME, OR GET ALONG WITH OTHER PEOPLE: SOMEWHAT DIFFICULT
1. LITTLE INTEREST OR PLEASURE IN DOING THINGS: MORE THAN HALF THE DAYS
SUM OF ALL RESPONSES TO PHQ QUESTIONS 1-9: 16
6. FEELING BAD ABOUT YOURSELF - OR THAT YOU ARE A FAILURE OR HAVE LET YOURSELF OR YOUR FAMILY DOWN: MORE THAN HALF THE DAYS
CLINICAL INTERPRETATION OF PHQ2 SCORE: FURTHER SCREENING NEEDED
3. TROUBLE FALLING OR STAYING ASLEEP OR SLEEPING TOO MUCH: MORE THAN HALF THE DAYS
7. TROUBLE CONCENTRATING ON THINGS, SUCH AS READING THE NEWSPAPER OR WATCHING TELEVISION: MORE THAN HALF THE DAYS
CLINICAL INTERPRETATION OF PHQ9 SCORE: MODERATELY SEVERE DEPRESSION
9. THOUGHTS THAT YOU WOULD BE BETTER OFF DEAD, OR OF HURTING YOURSELF: NOT AT ALL
SUM OF ALL RESPONSES TO PHQ9 QUESTIONS 1 AND 2: 4
5. POOR APPETITE, WEIGHT LOSS, OR OVEREATING: MORE THAN HALF THE DAYS
8. MOVING OR SPEAKING SO SLOWLY THAT OTHER PEOPLE COULD HAVE NOTICED. OR THE OPPOSITE, BEING SO FIGETY OR RESTLESS THAT YOU HAVE BEEN MOVING AROUND A LOT MORE THAN USUAL: MORE THAN HALF THE DAYS
SUM OF ALL RESPONSES TO PHQ9 QUESTIONS 1 AND 2: 4
2. FEELING DOWN, DEPRESSED OR HOPELESS: MORE THAN HALF THE DAYS

## 2022-01-27 ENCOUNTER — LAB SERVICES (OUTPATIENT)
Dept: LAB | Age: 57
End: 2022-01-27

## 2022-01-27 DIAGNOSIS — R30.0 DYSURIA: ICD-10-CM

## 2022-01-27 DIAGNOSIS — R73.01 IFG (IMPAIRED FASTING GLUCOSE): ICD-10-CM

## 2022-01-27 DIAGNOSIS — E78.5 HYPERLIPIDEMIA, UNSPECIFIED HYPERLIPIDEMIA TYPE: ICD-10-CM

## 2022-01-27 DIAGNOSIS — I10 ESSENTIAL HYPERTENSION: ICD-10-CM

## 2022-01-27 LAB
ALBUMIN SERPL-MCNC: 4.2 G/DL (ref 3.6–5.1)
ALP SERPL-CCNC: 95 U/L (ref 45–130)
ALT SERPL W/O P-5'-P-CCNC: 18 U/L (ref 4–38)
AST SERPL-CCNC: 21 U/L (ref 14–43)
BILIRUB SERPL-MCNC: 1 MG/DL (ref 0–1.3)
BILIRUBIN URINE: NEGATIVE
BLOOD URINE: ABNORMAL
BUN SERPL-MCNC: 13 MG/DL (ref 7–20)
CALCIUM SERPL-MCNC: 9.8 MG/DL (ref 8.6–10.6)
CHLORIDE SERPL-SCNC: 104 MMOL/L (ref 96–107)
CHOLEST SERPL-MCNC: 164 MG/DL (ref 140–200)
CLARITY: ABNORMAL
CO2 SERPL-SCNC: 27 MMOL/L (ref 22–32)
COLOR: YELLOW
CREAT SERPL-MCNC: 0.8 MG/DL (ref 0.5–1.4)
EST. AVERAGE GLUCOSE BLD GHB EST-MCNC: 116 MG/DL (ref 0–154)
GFR SERPL CREATININE-BSD FRML MDRD: >60 ML/MIN/{1.73M2}
GFR SERPL CREATININE-BSD FRML MDRD: >60 ML/MIN/{1.73M2}
GLUCOSE P FAST SERPL-MCNC: 111 MG/DL (ref 60–100)
GLUCOSE QUALITATIVE U: NEGATIVE
HBA1C MFR BLD: 5.7 % (ref 4.2–6)
HDLC SERPL-MCNC: 40 MG/DL
KETONES, URINE: NEGATIVE
LDLC SERPL CALC-MCNC: 84 MG/DL (ref 30–100)
LEUKOCYTE ESTERASE URINE: NEGATIVE
MUCOUS: ABNORMAL
NITRITE URINE: NEGATIVE
PH URINE: 5 (ref 5–7)
POTASSIUM SERPL-SCNC: 4.1 MMOL/L (ref 3.5–5.3)
PROT SERPL-MCNC: 7.3 G/DL (ref 6.4–8.5)
RED BLOOD CELLS URINE: ABNORMAL (ref 0–3)
SODIUM SERPL-SCNC: 136 MMOL/L (ref 136–146)
SPECIFIC GRAVITY URINE: 1.02 (ref 1–1.03)
SQUAMOUS EPITHELIAL CELLS: ABNORMAL
TRIGL SERPL-MCNC: 200 MG/DL (ref 0–200)
TSH SERPL DL<=0.05 MIU/L-ACNC: 2.19 M[IU]/L (ref 0.3–4.82)
URINE PROTEIN, QUAL (DIPSTICK): 30
UROBILINOGEN URINE: <2
WHITE BLOOD CELLS URINE: ABNORMAL (ref 0–5)

## 2022-01-27 PROCEDURE — 80053 COMPREHEN METABOLIC PANEL: CPT | Performed by: FAMILY MEDICINE

## 2022-01-27 PROCEDURE — 87086 URINE CULTURE/COLONY COUNT: CPT | Performed by: FAMILY MEDICINE

## 2022-01-27 PROCEDURE — 80061 LIPID PANEL: CPT | Performed by: FAMILY MEDICINE

## 2022-01-27 PROCEDURE — 36415 COLL VENOUS BLD VENIPUNCTURE: CPT | Performed by: FAMILY MEDICINE

## 2022-01-27 PROCEDURE — 83036 HEMOGLOBIN GLYCOSYLATED A1C: CPT | Performed by: FAMILY MEDICINE

## 2022-01-27 PROCEDURE — 81003 URINALYSIS AUTO W/O SCOPE: CPT | Performed by: FAMILY MEDICINE

## 2022-01-27 PROCEDURE — 87088 URINE BACTERIA CULTURE: CPT | Performed by: FAMILY MEDICINE

## 2022-01-27 PROCEDURE — 87186 SC STD MICRODIL/AGAR DIL: CPT | Performed by: FAMILY MEDICINE

## 2022-01-27 PROCEDURE — 84443 ASSAY THYROID STIM HORMONE: CPT | Performed by: FAMILY MEDICINE

## 2022-01-29 LAB — FINAL REPORT: ABNORMAL

## 2022-01-31 RX ORDER — CIPROFLOXACIN 250 MG/1
250 TABLET, FILM COATED ORAL 2 TIMES DAILY
Qty: 10 TABLET | Refills: 0 | Status: SHIPPED | OUTPATIENT
Start: 2022-01-31 | End: 2022-02-05

## 2022-02-25 ENCOUNTER — OFFICE VISIT (OUTPATIENT)
Dept: INTERNAL MEDICINE | Age: 57
End: 2022-02-25

## 2022-02-25 ENCOUNTER — TELEPHONE (OUTPATIENT)
Dept: INTERNAL MEDICINE | Age: 57
End: 2022-02-25

## 2022-02-25 VITALS
HEART RATE: 77 BPM | BODY MASS INDEX: 31.83 KG/M2 | SYSTOLIC BLOOD PRESSURE: 130 MMHG | TEMPERATURE: 97.9 F | HEIGHT: 62 IN | WEIGHT: 173 LBS | DIASTOLIC BLOOD PRESSURE: 76 MMHG | OXYGEN SATURATION: 99 %

## 2022-02-25 DIAGNOSIS — F33.0 MILD EPISODE OF RECURRENT MAJOR DEPRESSIVE DISORDER (CMD): Primary | ICD-10-CM

## 2022-02-25 PROCEDURE — 3078F DIAST BP <80 MM HG: CPT | Performed by: FAMILY MEDICINE

## 2022-02-25 PROCEDURE — 99214 OFFICE O/P EST MOD 30 MIN: CPT | Performed by: FAMILY MEDICINE

## 2022-02-25 PROCEDURE — 3075F SYST BP GE 130 - 139MM HG: CPT | Performed by: FAMILY MEDICINE

## 2022-06-01 ENCOUNTER — OFFICE VISIT (OUTPATIENT)
Dept: UROLOGY | Age: 57
End: 2022-06-01

## 2022-06-01 VITALS — WEIGHT: 174 LBS | TEMPERATURE: 97.9 F | HEIGHT: 61 IN | BODY MASS INDEX: 32.85 KG/M2

## 2022-06-01 DIAGNOSIS — N28.1 RENAL CYST: ICD-10-CM

## 2022-06-01 DIAGNOSIS — R31.29 MICROHEMATURIA: ICD-10-CM

## 2022-06-01 DIAGNOSIS — R10.9 LEFT FLANK PAIN: ICD-10-CM

## 2022-06-01 DIAGNOSIS — R30.0 DYSURIA: Primary | ICD-10-CM

## 2022-06-01 LAB
BACTERIA: ABNORMAL
BILIRUBIN URINE: NEGATIVE
BLOOD URINE: ABNORMAL
CLARITY: CLEAR
COLOR: YELLOW
GLUCOSE QUALITATIVE U: NEGATIVE
KETONES, URINE: NEGATIVE
LEUKOCYTE ESTERASE URINE: NEGATIVE
NITRITE URINE: NEGATIVE
PH URINE: 5.5 (ref 5–7)
RED BLOOD CELLS URINE: ABNORMAL (ref 0–3)
SPECIFIC GRAVITY URINE: >=1.03 (ref 1–1.03)
SQUAMOUS EPITHELIAL CELLS: ABNORMAL
TRANSITIONAL EPITHELIAL CELLS: ABNORMAL
URINE PROTEIN, QUAL (DIPSTICK): NEGATIVE
UROBILINOGEN URINE: ABNORMAL
WHITE BLOOD CELLS URINE: ABNORMAL (ref 0–5)

## 2022-06-01 PROCEDURE — 99214 OFFICE O/P EST MOD 30 MIN: CPT | Performed by: UROLOGY

## 2022-06-01 PROCEDURE — 81003 URINALYSIS AUTO W/O SCOPE: CPT | Performed by: UROLOGY

## 2022-06-01 PROCEDURE — 87186 SC STD MICRODIL/AGAR DIL: CPT | Performed by: UROLOGY

## 2022-06-01 PROCEDURE — 87088 URINE BACTERIA CULTURE: CPT | Performed by: UROLOGY

## 2022-06-01 PROCEDURE — 87086 URINE CULTURE/COLONY COUNT: CPT | Performed by: UROLOGY

## 2022-06-03 ENCOUNTER — TELEPHONE (OUTPATIENT)
Dept: UROLOGY | Age: 57
End: 2022-06-03

## 2022-06-03 LAB — FINAL REPORT: ABNORMAL

## 2022-06-03 RX ORDER — CIPROFLOXACIN 500 MG/1
500 TABLET, FILM COATED ORAL 2 TIMES DAILY
Qty: 14 TABLET | Refills: 0 | Status: SHIPPED | OUTPATIENT
Start: 2022-06-03 | End: 2023-04-18 | Stop reason: ALTCHOICE

## 2022-06-03 RX ORDER — CIPROFLOXACIN 500 MG/1
500 TABLET, FILM COATED ORAL 2 TIMES DAILY
Qty: 14 TABLET | Refills: 0 | Status: CANCELLED | OUTPATIENT
Start: 2022-06-03 | End: 2022-06-10

## 2022-06-20 ENCOUNTER — IMAGING SERVICES (OUTPATIENT)
Dept: ULTRASOUND IMAGING | Age: 57
End: 2022-06-20
Attending: UROLOGY

## 2022-06-20 DIAGNOSIS — R10.9 LEFT FLANK PAIN: ICD-10-CM

## 2022-06-20 DIAGNOSIS — N28.1 RENAL CYST: ICD-10-CM

## 2022-06-20 PROCEDURE — 76770 US EXAM ABDO BACK WALL COMP: CPT | Performed by: RADIOLOGY

## 2022-06-22 DIAGNOSIS — N28.1 RENAL CYST: Primary | ICD-10-CM

## 2022-06-23 ENCOUNTER — APPOINTMENT (OUTPATIENT)
Dept: CT IMAGING | Age: 57
End: 2022-06-23
Attending: EMERGENCY MEDICINE
Payer: COMMERCIAL

## 2022-06-23 ENCOUNTER — HOSPITAL ENCOUNTER (EMERGENCY)
Age: 57
Discharge: HOME | End: 2022-06-23
Attending: EMERGENCY MEDICINE
Payer: COMMERCIAL

## 2022-06-23 VITALS
HEART RATE: 66 BPM | HEIGHT: 61 IN | DIASTOLIC BLOOD PRESSURE: 63 MMHG | BODY MASS INDEX: 33.04 KG/M2 | OXYGEN SATURATION: 98 % | RESPIRATION RATE: 16 BRPM | WEIGHT: 175 LBS | TEMPERATURE: 98 F | SYSTOLIC BLOOD PRESSURE: 143 MMHG

## 2022-06-23 DIAGNOSIS — N83.201 RIGHT OVARIAN CYST: Primary | ICD-10-CM

## 2022-06-23 LAB
ALBUMIN SERPL-MCNC: 3.7 G/DL (ref 3.4–5)
ALBUMIN/GLOB SERPL: 0.9 {RATIO} (ref 1–2)
ALP LIVER SERPL-CCNC: 98 U/L
ALT SERPL-CCNC: 24 U/L
ANION GAP SERPL CALC-SCNC: 4 MMOL/L (ref 0–18)
AST SERPL-CCNC: 17 U/L (ref 15–37)
BASOPHILS # BLD AUTO: 0.03 X10(3) UL (ref 0–0.2)
BASOPHILS NFR BLD AUTO: 0.2 %
BILIRUB SERPL-MCNC: 0.6 MG/DL (ref 0.1–2)
BILIRUB UR QL STRIP.AUTO: NEGATIVE
BUN BLD-MCNC: 14 MG/DL (ref 7–18)
CALCIUM BLD-MCNC: 9.5 MG/DL (ref 8.5–10.1)
CHLORIDE SERPL-SCNC: 108 MMOL/L (ref 98–112)
CLARITY UR REFRACT.AUTO: CLEAR
CO2 SERPL-SCNC: 29 MMOL/L (ref 21–32)
COLOR UR AUTO: YELLOW
CREAT BLD-MCNC: 0.75 MG/DL
EOSINOPHIL # BLD AUTO: 0.09 X10(3) UL (ref 0–0.7)
EOSINOPHIL NFR BLD AUTO: 0.7 %
ERYTHROCYTE [DISTWIDTH] IN BLOOD BY AUTOMATED COUNT: 13.1 %
GLOBULIN PLAS-MCNC: 4 G/DL (ref 2.8–4.4)
GLUCOSE BLD-MCNC: 96 MG/DL (ref 70–99)
GLUCOSE UR STRIP.AUTO-MCNC: NEGATIVE MG/DL
HCT VFR BLD AUTO: 39.3 %
HGB BLD-MCNC: 12.5 G/DL
IMM GRANULOCYTES # BLD AUTO: 0.04 X10(3) UL (ref 0–1)
IMM GRANULOCYTES NFR BLD: 0.3 %
KETONES UR STRIP.AUTO-MCNC: NEGATIVE MG/DL
LEUKOCYTE ESTERASE UR QL STRIP.AUTO: NEGATIVE
LYMPHOCYTES # BLD AUTO: 2.75 X10(3) UL (ref 1–4)
LYMPHOCYTES NFR BLD AUTO: 21.6 %
MCH RBC QN AUTO: 27.5 PG (ref 26–34)
MCHC RBC AUTO-ENTMCNC: 31.8 G/DL (ref 31–37)
MCV RBC AUTO: 86.6 FL
MONOCYTES # BLD AUTO: 0.74 X10(3) UL (ref 0.1–1)
MONOCYTES NFR BLD AUTO: 5.8 %
NEUTROPHILS # BLD AUTO: 9.1 X10 (3) UL (ref 1.5–7.7)
NEUTROPHILS # BLD AUTO: 9.1 X10(3) UL (ref 1.5–7.7)
NEUTROPHILS NFR BLD AUTO: 71.4 %
NITRITE UR QL STRIP.AUTO: NEGATIVE
OSMOLALITY SERPL CALC.SUM OF ELEC: 292 MOSM/KG (ref 275–295)
PH UR STRIP.AUTO: 5.5 [PH] (ref 5–8)
PLATELET # BLD AUTO: 312 10(3)UL (ref 150–450)
POTASSIUM SERPL-SCNC: 4.1 MMOL/L (ref 3.5–5.1)
PROT SERPL-MCNC: 7.7 G/DL (ref 6.4–8.2)
PROT UR STRIP.AUTO-MCNC: NEGATIVE MG/DL
RBC # BLD AUTO: 4.54 X10(6)UL
SODIUM SERPL-SCNC: 141 MMOL/L (ref 136–145)
SP GR UR STRIP.AUTO: >=1.03 (ref 1–1.03)
UROBILINOGEN UR STRIP.AUTO-MCNC: 0.2 MG/DL
WBC # BLD AUTO: 12.8 X10(3) UL (ref 4–11)

## 2022-06-23 PROCEDURE — 96361 HYDRATE IV INFUSION ADD-ON: CPT

## 2022-06-23 PROCEDURE — 99284 EMERGENCY DEPT VISIT MOD MDM: CPT

## 2022-06-23 PROCEDURE — 96375 TX/PRO/DX INJ NEW DRUG ADDON: CPT

## 2022-06-23 PROCEDURE — 81001 URINALYSIS AUTO W/SCOPE: CPT | Performed by: EMERGENCY MEDICINE

## 2022-06-23 PROCEDURE — 96374 THER/PROPH/DIAG INJ IV PUSH: CPT

## 2022-06-23 PROCEDURE — 74177 CT ABD & PELVIS W/CONTRAST: CPT | Performed by: EMERGENCY MEDICINE

## 2022-06-23 PROCEDURE — 85025 COMPLETE CBC W/AUTO DIFF WBC: CPT | Performed by: EMERGENCY MEDICINE

## 2022-06-23 PROCEDURE — 80053 COMPREHEN METABOLIC PANEL: CPT | Performed by: EMERGENCY MEDICINE

## 2022-06-23 RX ORDER — KETOROLAC TROMETHAMINE 15 MG/ML
15 INJECTION, SOLUTION INTRAMUSCULAR; INTRAVENOUS ONCE
Status: COMPLETED | OUTPATIENT
Start: 2022-06-23 | End: 2022-06-23

## 2022-06-23 NOTE — ED INITIAL ASSESSMENT (HPI)
Right side/abdom pain since Monday, getting worse, stomach feels bloated, frequent bowel movements but not diarrhea, denies nausea/vomiting/fever/flank pain

## 2022-07-21 ENCOUNTER — OFFICE VISIT (OUTPATIENT)
Dept: OBGYN | Age: 57
End: 2022-07-21

## 2022-07-21 VITALS
RESPIRATION RATE: 20 BRPM | SYSTOLIC BLOOD PRESSURE: 144 MMHG | WEIGHT: 175.6 LBS | HEART RATE: 76 BPM | HEIGHT: 61 IN | TEMPERATURE: 97.5 F | BODY MASS INDEX: 33.15 KG/M2 | DIASTOLIC BLOOD PRESSURE: 82 MMHG

## 2022-07-21 DIAGNOSIS — Z01.419 ENCOUNTER FOR GYNECOLOGICAL EXAMINATION WITHOUT ABNORMAL FINDING: Primary | ICD-10-CM

## 2022-07-21 DIAGNOSIS — Z11.3 SCREEN FOR STD (SEXUALLY TRANSMITTED DISEASE): ICD-10-CM

## 2022-07-21 PROCEDURE — 99396 PREV VISIT EST AGE 40-64: CPT | Performed by: OBSTETRICS & GYNECOLOGY

## 2022-07-21 PROCEDURE — 87661 TRICHOMONAS VAGINALIS AMPLIF: CPT | Performed by: OBSTETRICS & GYNECOLOGY

## 2022-07-21 PROCEDURE — 3077F SYST BP >= 140 MM HG: CPT | Performed by: OBSTETRICS & GYNECOLOGY

## 2022-07-21 PROCEDURE — 3079F DIAST BP 80-89 MM HG: CPT | Performed by: OBSTETRICS & GYNECOLOGY

## 2022-07-21 PROCEDURE — 87591 N.GONORRHOEAE DNA AMP PROB: CPT | Performed by: OBSTETRICS & GYNECOLOGY

## 2022-07-21 PROCEDURE — 87491 CHLMYD TRACH DNA AMP PROBE: CPT | Performed by: OBSTETRICS & GYNECOLOGY

## 2022-07-22 LAB
C TRACH DNA GENITAL QL NAA+PROBE: NEGATIVE
N GONORRHOEA DNA GENITAL QL NAA+PROBE: NEGATIVE
T VAGINALIS DNA GENITAL QL NAA+PROBE: NEGATIVE

## 2022-07-28 ENCOUNTER — IMAGING SERVICES (OUTPATIENT)
Dept: ULTRASOUND IMAGING | Age: 57
End: 2022-07-28
Attending: OBSTETRICS & GYNECOLOGY

## 2022-07-28 DIAGNOSIS — Z01.419 ENCOUNTER FOR GYNECOLOGICAL EXAMINATION WITHOUT ABNORMAL FINDING: ICD-10-CM

## 2022-07-28 PROCEDURE — 76856 US EXAM PELVIC COMPLETE: CPT | Performed by: RADIOLOGY

## 2022-07-28 PROCEDURE — 76830 TRANSVAGINAL US NON-OB: CPT | Performed by: RADIOLOGY

## 2022-07-29 DIAGNOSIS — N83.209 CYST OF OVARY, UNSPECIFIED LATERALITY: Primary | ICD-10-CM

## 2022-08-01 ENCOUNTER — LAB SERVICES (OUTPATIENT)
Dept: LAB | Age: 57
End: 2022-08-01

## 2022-08-01 ENCOUNTER — LAB REQUISITION (OUTPATIENT)
Dept: LAB | Age: 57
End: 2022-08-01

## 2022-08-01 DIAGNOSIS — N83.209 UNSPECIFIED OVARIAN CYST, UNSPECIFIED SIDE: ICD-10-CM

## 2022-08-01 DIAGNOSIS — N83.209 CYST OF OVARY, UNSPECIFIED LATERALITY: ICD-10-CM

## 2022-08-01 PROCEDURE — 36415 COLL VENOUS BLD VENIPUNCTURE: CPT | Performed by: OBSTETRICS & GYNECOLOGY

## 2022-08-01 PROCEDURE — PSEU8182 CA 125: Performed by: CLINICAL MEDICAL LABORATORY

## 2022-08-01 PROCEDURE — 86304 IMMUNOASSAY TUMOR CA 125: CPT | Performed by: CLINICAL MEDICAL LABORATORY

## 2022-08-01 PROCEDURE — 86304 IMMUNOASSAY TUMOR CA 125: CPT | Performed by: OBSTETRICS & GYNECOLOGY

## 2022-08-01 RX ORDER — ESTRADIOL 0.1 MG/G
1 CREAM VAGINAL
Qty: 42.5 G | Refills: 12 | Status: SHIPPED | OUTPATIENT
Start: 2022-08-01 | End: 2023-04-18 | Stop reason: ALTCHOICE

## 2022-08-02 LAB
CANCER AG125 SERPL-ACNC: 4 UNITS/ML (ref 0–35)
CANCER AG125 SERPL-ACNC: 4 UNITS/ML (ref 0–35)

## 2022-08-04 ENCOUNTER — OFFICE VISIT (OUTPATIENT)
Dept: OBGYN | Age: 57
End: 2022-08-04

## 2022-08-04 ENCOUNTER — TELEPHONE (OUTPATIENT)
Dept: OBGYN | Age: 57
End: 2022-08-04

## 2022-08-04 VITALS
SYSTOLIC BLOOD PRESSURE: 130 MMHG | TEMPERATURE: 97.1 F | HEIGHT: 61 IN | BODY MASS INDEX: 32.92 KG/M2 | RESPIRATION RATE: 14 BRPM | DIASTOLIC BLOOD PRESSURE: 86 MMHG | WEIGHT: 174.38 LBS

## 2022-08-04 DIAGNOSIS — N94.89 ADNEXAL MASS: ICD-10-CM

## 2022-08-04 DIAGNOSIS — N83.299 COMPLEX OVARIAN CYST: Primary | ICD-10-CM

## 2022-08-04 PROCEDURE — 3075F SYST BP GE 130 - 139MM HG: CPT | Performed by: OBSTETRICS & GYNECOLOGY

## 2022-08-04 PROCEDURE — 99214 OFFICE O/P EST MOD 30 MIN: CPT | Performed by: OBSTETRICS & GYNECOLOGY

## 2022-08-04 PROCEDURE — 3079F DIAST BP 80-89 MM HG: CPT | Performed by: OBSTETRICS & GYNECOLOGY

## 2022-08-04 RX ORDER — METRONIDAZOLE 500 MG/1
500 TABLET ORAL 2 TIMES DAILY
Qty: 14 TABLET | Refills: 0 | Status: SHIPPED | OUTPATIENT
Start: 2022-08-04 | End: 2022-08-11

## 2022-08-15 DIAGNOSIS — F33.0 MILD EPISODE OF RECURRENT MAJOR DEPRESSIVE DISORDER (CMD): ICD-10-CM

## 2022-08-26 ENCOUNTER — APPOINTMENT (OUTPATIENT)
Dept: INTERNAL MEDICINE | Age: 57
End: 2022-08-26

## 2022-10-19 ENCOUNTER — TELEPHONE (OUTPATIENT)
Dept: OBGYN | Age: 57
End: 2022-10-19

## 2022-10-19 DIAGNOSIS — N83.299 COMPLEX OVARIAN CYST: Primary | ICD-10-CM

## 2022-11-11 ENCOUNTER — WALK IN (OUTPATIENT)
Dept: URGENT CARE | Age: 57
End: 2022-11-11

## 2022-11-11 VITALS
SYSTOLIC BLOOD PRESSURE: 130 MMHG | OXYGEN SATURATION: 100 % | DIASTOLIC BLOOD PRESSURE: 74 MMHG | HEART RATE: 78 BPM | TEMPERATURE: 98.4 F | RESPIRATION RATE: 18 BRPM

## 2022-11-11 DIAGNOSIS — R31.9 HEMATURIA, UNSPECIFIED TYPE: Primary | ICD-10-CM

## 2022-11-11 DIAGNOSIS — R10.32 ABDOMINAL PAIN, LLQ (LEFT LOWER QUADRANT): ICD-10-CM

## 2022-11-11 LAB
APPEARANCE UR: ABNORMAL
BACTERIA #/AREA URNS HPF: ABNORMAL /HPF
BILIRUB UR QL STRIP: NEGATIVE
COLOR UR: YELLOW
GLUCOSE UR STRIP-MCNC: NEGATIVE MG/DL
HGB UR QL STRIP: ABNORMAL
HYALINE CASTS #/AREA URNS LPF: ABNORMAL /LPF
KETONES UR STRIP-MCNC: NEGATIVE MG/DL
LEUKOCYTE ESTERASE UR QL STRIP: NEGATIVE
MUCOUS THREADS URNS QL MICRO: PRESENT
NITRITE UR QL STRIP: NEGATIVE
PH UR STRIP: 5 [PH] (ref 5–7)
PROT UR STRIP-MCNC: NEGATIVE MG/DL
RBC #/AREA URNS HPF: ABNORMAL /HPF
SP GR UR STRIP: 1.02 (ref 1–1.03)
SQUAMOUS #/AREA URNS HPF: ABNORMAL /HPF
UROBILINOGEN UR STRIP-MCNC: 0.2 MG/DL
WBC #/AREA URNS HPF: ABNORMAL /HPF

## 2022-11-11 PROCEDURE — 3075F SYST BP GE 130 - 139MM HG: CPT | Performed by: EMERGENCY MEDICINE

## 2022-11-11 PROCEDURE — 81001 URINALYSIS AUTO W/SCOPE: CPT | Performed by: INTERNAL MEDICINE

## 2022-11-11 PROCEDURE — 3078F DIAST BP <80 MM HG: CPT | Performed by: EMERGENCY MEDICINE

## 2022-11-11 PROCEDURE — 99214 OFFICE O/P EST MOD 30 MIN: CPT | Performed by: EMERGENCY MEDICINE

## 2022-11-11 RX ORDER — ONDANSETRON 4 MG/1
4 TABLET, ORALLY DISINTEGRATING ORAL EVERY 8 HOURS PRN
Qty: 10 TABLET | Refills: 0 | Status: SHIPPED | OUTPATIENT
Start: 2022-11-11 | End: 2022-11-16

## 2022-11-11 RX ORDER — AMOXICILLIN AND CLAVULANATE POTASSIUM 875; 125 MG/1; MG/1
1 TABLET, FILM COATED ORAL 2 TIMES DAILY
Qty: 20 TABLET | Refills: 0 | Status: SHIPPED | OUTPATIENT
Start: 2022-11-11 | End: 2022-11-21

## 2022-12-14 DIAGNOSIS — F33.0 MILD EPISODE OF RECURRENT MAJOR DEPRESSIVE DISORDER (CMD): ICD-10-CM

## 2022-12-23 ENCOUNTER — APPOINTMENT (OUTPATIENT)
Dept: MRI IMAGING | Age: 57
End: 2022-12-23
Attending: OBSTETRICS & GYNECOLOGY

## 2023-01-13 DIAGNOSIS — E78.5 HYPERLIPIDEMIA, UNSPECIFIED HYPERLIPIDEMIA TYPE: ICD-10-CM

## 2023-01-13 DIAGNOSIS — I10 ESSENTIAL HYPERTENSION: ICD-10-CM

## 2023-01-13 RX ORDER — ATORVASTATIN CALCIUM 40 MG/1
40 TABLET, FILM COATED ORAL DAILY
Qty: 90 TABLET | Refills: 3 | Status: SHIPPED | OUTPATIENT
Start: 2023-01-13

## 2023-01-13 RX ORDER — TELMISARTAN 80 MG/1
80 TABLET ORAL DAILY
Qty: 90 TABLET | Refills: 3 | Status: SHIPPED | OUTPATIENT
Start: 2023-01-13

## 2023-02-14 ENCOUNTER — APPOINTMENT (OUTPATIENT)
Dept: MRI IMAGING | Age: 58
End: 2023-02-14
Attending: OBSTETRICS & GYNECOLOGY

## 2023-02-20 ENCOUNTER — APPOINTMENT (OUTPATIENT)
Dept: ULTRASOUND IMAGING | Age: 58
End: 2023-02-20
Attending: OBSTETRICS & GYNECOLOGY

## 2023-04-10 ENCOUNTER — WALK IN (OUTPATIENT)
Dept: URGENT CARE | Age: 58
End: 2023-04-10

## 2023-04-10 VITALS
RESPIRATION RATE: 16 BRPM | OXYGEN SATURATION: 99 % | HEART RATE: 66 BPM | DIASTOLIC BLOOD PRESSURE: 88 MMHG | TEMPERATURE: 97.4 F | SYSTOLIC BLOOD PRESSURE: 124 MMHG

## 2023-04-10 DIAGNOSIS — R30.0 DYSURIA: Primary | ICD-10-CM

## 2023-04-10 DIAGNOSIS — R31.9 HEMATURIA, UNSPECIFIED TYPE: ICD-10-CM

## 2023-04-10 LAB
ANION GAP SERPL CALC-SCNC: 15 MMOL/L (ref 7–19)
APPEARANCE UR: ABNORMAL
BACTERIA #/AREA URNS HPF: ABNORMAL /HPF
BASOPHILS # BLD: 0 K/MCL (ref 0–0.3)
BASOPHILS NFR BLD: 0 %
BILIRUB UR QL STRIP: NEGATIVE
BUN SERPL-MCNC: 9 MG/DL (ref 6–20)
BUN/CREAT SERPL: 16 (ref 7–25)
CALCIUM SERPL-MCNC: 9.1 MG/DL (ref 8.4–10.2)
CHLORIDE SERPL-SCNC: 104 MMOL/L (ref 97–110)
CO2 SERPL-SCNC: 25 MMOL/L (ref 21–32)
COLOR UR: YELLOW
CREAT SERPL-MCNC: 0.58 MG/DL (ref 0.51–0.95)
DEPRECATED RDW RBC: 42.1 FL (ref 39–50)
EOSINOPHIL # BLD: 0.2 K/MCL (ref 0–0.5)
EOSINOPHIL NFR BLD: 2 %
ERYTHROCYTE [DISTWIDTH] IN BLOOD: 13.2 % (ref 11–15)
FASTING DURATION TIME PATIENT: ABNORMAL H
GFR SERPLBLD BASED ON 1.73 SQ M-ARVRAT: >90 ML/MIN
GLUCOSE SERPL-MCNC: 136 MG/DL (ref 70–99)
GLUCOSE UR STRIP-MCNC: NEGATIVE MG/DL
HCT VFR BLD CALC: 41.5 % (ref 36–46.5)
HGB BLD-MCNC: 13.3 G/DL (ref 12–15.5)
HGB UR QL STRIP: ABNORMAL
HYALINE CASTS #/AREA URNS LPF: ABNORMAL /LPF
IMM GRANULOCYTES # BLD AUTO: 0 K/MCL (ref 0–0.2)
IMM GRANULOCYTES # BLD: 0 %
KETONES UR STRIP-MCNC: NEGATIVE MG/DL
LEUKOCYTE ESTERASE UR QL STRIP: NEGATIVE
LYMPHOCYTES # BLD: 2.9 K/MCL (ref 1–4)
LYMPHOCYTES NFR BLD: 30 %
MCH RBC QN AUTO: 27.9 PG (ref 26–34)
MCHC RBC AUTO-ENTMCNC: 32 G/DL (ref 32–36.5)
MCV RBC AUTO: 87.2 FL (ref 78–100)
MONOCYTES # BLD: 0.5 K/MCL (ref 0.3–0.9)
MONOCYTES NFR BLD: 6 %
MUCOUS THREADS URNS QL MICRO: PRESENT
NEUTROPHILS # BLD: 6.2 K/MCL (ref 1.8–7.7)
NEUTROPHILS NFR BLD: 62 %
NITRITE UR QL STRIP: NEGATIVE
NRBC BLD MANUAL-RTO: 0 /100 WBC
PH UR STRIP: 5 [PH] (ref 5–7)
PLATELET # BLD AUTO: 304 K/MCL (ref 140–450)
POTASSIUM SERPL-SCNC: 4 MMOL/L (ref 3.4–5.1)
PROT UR STRIP-MCNC: NEGATIVE MG/DL
RBC # BLD: 4.76 MIL/MCL (ref 4–5.2)
RBC #/AREA URNS HPF: ABNORMAL /HPF
SODIUM SERPL-SCNC: 140 MMOL/L (ref 135–145)
SP GR UR STRIP: 1.02 (ref 1–1.03)
SQUAMOUS #/AREA URNS HPF: ABNORMAL /HPF
UROBILINOGEN UR STRIP-MCNC: 0.2 MG/DL
WBC # BLD: 9.9 K/MCL (ref 4.2–11)
WBC #/AREA URNS HPF: ABNORMAL /HPF

## 2023-04-10 PROCEDURE — 81001 URINALYSIS AUTO W/SCOPE: CPT | Performed by: INTERNAL MEDICINE

## 2023-04-10 PROCEDURE — 99215 OFFICE O/P EST HI 40 MIN: CPT | Performed by: EMERGENCY MEDICINE

## 2023-04-10 PROCEDURE — 87086 URINE CULTURE/COLONY COUNT: CPT | Performed by: INTERNAL MEDICINE

## 2023-04-10 PROCEDURE — 3074F SYST BP LT 130 MM HG: CPT | Performed by: EMERGENCY MEDICINE

## 2023-04-10 PROCEDURE — 3079F DIAST BP 80-89 MM HG: CPT | Performed by: EMERGENCY MEDICINE

## 2023-04-10 PROCEDURE — 85025 COMPLETE CBC W/AUTO DIFF WBC: CPT | Performed by: INTERNAL MEDICINE

## 2023-04-10 PROCEDURE — 80048 BASIC METABOLIC PNL TOTAL CA: CPT | Performed by: INTERNAL MEDICINE

## 2023-04-10 RX ORDER — ACETAMINOPHEN 500 MG
500 TABLET ORAL EVERY 4 HOURS PRN
Qty: 30 TABLET | Refills: 0 | Status: SHIPPED | OUTPATIENT
Start: 2023-04-10 | End: 2023-04-18 | Stop reason: ALTCHOICE

## 2023-04-12 ENCOUNTER — NURSE TRIAGE (OUTPATIENT)
Dept: INTERNAL MEDICINE | Age: 58
End: 2023-04-12

## 2023-04-12 LAB — BACTERIA UR CULT: NORMAL

## 2023-04-14 ENCOUNTER — OFFICE VISIT (OUTPATIENT)
Dept: UROLOGY | Age: 58
End: 2023-04-14

## 2023-04-14 VITALS — HEIGHT: 61 IN | WEIGHT: 182 LBS | TEMPERATURE: 97.1 F | BODY MASS INDEX: 34.36 KG/M2

## 2023-04-14 DIAGNOSIS — R10.9 LEFT FLANK PAIN: ICD-10-CM

## 2023-04-14 DIAGNOSIS — R31.29 MICROHEMATURIA: Primary | ICD-10-CM

## 2023-04-14 DIAGNOSIS — N28.1 COMPLEX RENAL CYST: ICD-10-CM

## 2023-04-14 DIAGNOSIS — R35.0 URINARY FREQUENCY: ICD-10-CM

## 2023-04-14 LAB — BLDR SCAN MLS: 106

## 2023-04-14 PROCEDURE — 99214 OFFICE O/P EST MOD 30 MIN: CPT | Performed by: UROLOGY

## 2023-04-14 PROCEDURE — 51798 US URINE CAPACITY MEASURE: CPT | Performed by: UROLOGY

## 2023-04-18 ENCOUNTER — OFFICE VISIT (OUTPATIENT)
Dept: INTERNAL MEDICINE | Age: 58
End: 2023-04-18

## 2023-04-18 ENCOUNTER — IMAGING SERVICES (OUTPATIENT)
Dept: ULTRASOUND IMAGING | Age: 58
End: 2023-04-18

## 2023-04-18 ENCOUNTER — LAB SERVICES (OUTPATIENT)
Dept: LAB | Age: 58
End: 2023-04-18

## 2023-04-18 VITALS
WEIGHT: 189 LBS | SYSTOLIC BLOOD PRESSURE: 116 MMHG | RESPIRATION RATE: 16 BRPM | HEIGHT: 61 IN | BODY MASS INDEX: 35.68 KG/M2 | DIASTOLIC BLOOD PRESSURE: 70 MMHG | TEMPERATURE: 97.9 F | HEART RATE: 70 BPM

## 2023-04-18 DIAGNOSIS — E78.5 HYPERLIPIDEMIA, UNSPECIFIED HYPERLIPIDEMIA TYPE: ICD-10-CM

## 2023-04-18 DIAGNOSIS — I10 ESSENTIAL HYPERTENSION: ICD-10-CM

## 2023-04-18 DIAGNOSIS — R10.9 LEFT FLANK PAIN: ICD-10-CM

## 2023-04-18 DIAGNOSIS — Z12.31 ENCOUNTER FOR SCREENING MAMMOGRAM FOR MALIGNANT NEOPLASM OF BREAST: ICD-10-CM

## 2023-04-18 DIAGNOSIS — R35.0 INCREASED URINARY FREQUENCY: ICD-10-CM

## 2023-04-18 DIAGNOSIS — R73.01 IFG (IMPAIRED FASTING GLUCOSE): ICD-10-CM

## 2023-04-18 DIAGNOSIS — R42 VERTIGO: Primary | ICD-10-CM

## 2023-04-18 DIAGNOSIS — N28.1 COMPLEX RENAL CYST: ICD-10-CM

## 2023-04-18 PROBLEM — E78.00 PURE HYPERCHOLESTEROLEMIA: Status: RESOLVED | Noted: 2018-09-04 | Resolved: 2023-04-18

## 2023-04-18 LAB
ALBUMIN SERPL-MCNC: 3.5 G/DL (ref 3.6–5.1)
ALBUMIN/GLOB SERPL: 1 {RATIO} (ref 1–2.4)
ALP SERPL-CCNC: 93 UNITS/L (ref 45–117)
ALT SERPL-CCNC: 33 UNITS/L
ANION GAP SERPL CALC-SCNC: 12 MMOL/L (ref 7–19)
AST SERPL-CCNC: 16 UNITS/L
BILIRUB SERPL-MCNC: 0.5 MG/DL (ref 0.2–1)
BUN SERPL-MCNC: 14 MG/DL (ref 6–20)
BUN/CREAT SERPL: 23 (ref 7–25)
CALCIUM SERPL-MCNC: 8.9 MG/DL (ref 8.4–10.2)
CHLORIDE SERPL-SCNC: 104 MMOL/L (ref 97–110)
CHOLEST SERPL-MCNC: 165 MG/DL
CHOLEST/HDLC SERPL: 3.8 {RATIO}
CO2 SERPL-SCNC: 27 MMOL/L (ref 21–32)
CREAT SERPL-MCNC: 0.62 MG/DL (ref 0.51–0.95)
FASTING DURATION TIME PATIENT: ABNORMAL H
FASTING DURATION TIME PATIENT: ABNORMAL H
GFR SERPLBLD BASED ON 1.73 SQ M-ARVRAT: >90 ML/MIN
GLOBULIN SER-MCNC: 3.4 G/DL (ref 2–4)
GLUCOSE SERPL-MCNC: 135 MG/DL (ref 70–99)
HBA1C MFR BLD: 6.6 % (ref 4.5–5.6)
HDLC SERPL-MCNC: 43 MG/DL
LDLC SERPL CALC-MCNC: 91 MG/DL
NONHDLC SERPL-MCNC: 122 MG/DL
POTASSIUM SERPL-SCNC: 4.4 MMOL/L (ref 3.4–5.1)
PROT SERPL-MCNC: 6.9 G/DL (ref 6.4–8.2)
SODIUM SERPL-SCNC: 139 MMOL/L (ref 135–145)
TRIGL SERPL-MCNC: 155 MG/DL

## 2023-04-18 PROCEDURE — 3074F SYST BP LT 130 MM HG: CPT | Performed by: FAMILY MEDICINE

## 2023-04-18 PROCEDURE — 76770 US EXAM ABDO BACK WALL COMP: CPT | Performed by: RADIOLOGY

## 2023-04-18 PROCEDURE — 36415 COLL VENOUS BLD VENIPUNCTURE: CPT | Performed by: FAMILY MEDICINE

## 2023-04-18 PROCEDURE — 3078F DIAST BP <80 MM HG: CPT | Performed by: FAMILY MEDICINE

## 2023-04-18 PROCEDURE — 83036 HEMOGLOBIN GLYCOSYLATED A1C: CPT | Performed by: INTERNAL MEDICINE

## 2023-04-18 PROCEDURE — 80061 LIPID PANEL: CPT | Performed by: INTERNAL MEDICINE

## 2023-04-18 PROCEDURE — 99214 OFFICE O/P EST MOD 30 MIN: CPT | Performed by: FAMILY MEDICINE

## 2023-04-18 PROCEDURE — 80053 COMPREHEN METABOLIC PANEL: CPT | Performed by: INTERNAL MEDICINE

## 2023-04-18 ASSESSMENT — PATIENT HEALTH QUESTIONNAIRE - PHQ9
2. FEELING DOWN, DEPRESSED OR HOPELESS: NOT AT ALL
2. FEELING DOWN, DEPRESSED OR HOPELESS: NOT AT ALL
SUM OF ALL RESPONSES TO PHQ9 QUESTIONS 1 AND 2: 0
SUM OF ALL RESPONSES TO PHQ9 QUESTIONS 1 AND 2: 0
CLINICAL INTERPRETATION OF PHQ2 SCORE: NO FURTHER SCREENING NEEDED
SUM OF ALL RESPONSES TO PHQ9 QUESTIONS 1 AND 2: 0
CLINICAL INTERPRETATION OF PHQ2 SCORE: NO FURTHER SCREENING NEEDED
1. LITTLE INTEREST OR PLEASURE IN DOING THINGS: NOT AT ALL
1. LITTLE INTEREST OR PLEASURE IN DOING THINGS: NOT AT ALL
SUM OF ALL RESPONSES TO PHQ9 QUESTIONS 1 AND 2: 0

## 2023-04-20 ENCOUNTER — IMAGING SERVICES (OUTPATIENT)
Dept: ULTRASOUND IMAGING | Age: 58
End: 2023-04-20

## 2023-04-20 ENCOUNTER — APPOINTMENT (OUTPATIENT)
Dept: ULTRASOUND IMAGING | Age: 58
End: 2023-04-20
Attending: OBSTETRICS & GYNECOLOGY

## 2023-04-20 DIAGNOSIS — N83.299 COMPLEX OVARIAN CYST: ICD-10-CM

## 2023-04-20 PROCEDURE — 76856 US EXAM PELVIC COMPLETE: CPT | Performed by: RADIOLOGY

## 2023-04-20 PROCEDURE — 76830 TRANSVAGINAL US NON-OB: CPT | Performed by: RADIOLOGY

## 2023-04-21 RX ORDER — ESTRADIOL 0.1 MG/G
1 CREAM VAGINAL
Qty: 42.5 G | Refills: 6 | Status: SHIPPED | OUTPATIENT
Start: 2023-04-21

## 2023-04-24 ENCOUNTER — TELEPHONE (OUTPATIENT)
Dept: OPHTHALMOLOGY | Age: 58
End: 2023-04-24

## 2023-04-25 ENCOUNTER — OFFICE VISIT (OUTPATIENT)
Dept: UROLOGY | Age: 58
End: 2023-04-25

## 2023-04-25 DIAGNOSIS — R35.0 URINARY FREQUENCY: ICD-10-CM

## 2023-04-25 DIAGNOSIS — R33.9 INCOMPLETE EMPTYING OF BLADDER: ICD-10-CM

## 2023-04-25 DIAGNOSIS — R31.29 MICROHEMATURIA: Primary | ICD-10-CM

## 2023-04-25 PROCEDURE — 52000 CYSTOURETHROSCOPY: CPT | Performed by: UROLOGY

## 2023-04-25 RX ORDER — SOLIFENACIN SUCCINATE 5 MG/1
5 TABLET, FILM COATED ORAL DAILY
Qty: 90 TABLET | Refills: 3 | Status: SHIPPED | OUTPATIENT
Start: 2023-04-25

## 2023-04-25 RX ORDER — NITROFURANTOIN 25; 75 MG/1; MG/1
100 CAPSULE ORAL 2 TIMES DAILY
Qty: 2 CAPSULE | Refills: 0 | Status: SHIPPED | OUTPATIENT
Start: 2023-04-25 | End: 2023-04-26

## 2023-04-27 ENCOUNTER — OFFICE VISIT (OUTPATIENT)
Dept: OPHTHALMOLOGY | Age: 58
End: 2023-04-27

## 2023-04-27 DIAGNOSIS — H02.889 MGD (MEIBOMIAN GLAND DYSFUNCTION): Primary | ICD-10-CM

## 2023-04-27 DIAGNOSIS — R73.01 IMPAIRED FASTING GLUCOSE: ICD-10-CM

## 2023-04-27 PROCEDURE — 92004 COMPRE OPH EXAM NEW PT 1/>: CPT | Performed by: OPHTHALMOLOGY

## 2023-04-27 ASSESSMENT — CONF VISUAL FIELD
OD_INFERIOR_NASAL_RESTRICTION: 0
OS_INFERIOR_TEMPORAL_RESTRICTION: 0
OD_SUPERIOR_TEMPORAL_RESTRICTION: 0
OD_INFERIOR_TEMPORAL_RESTRICTION: 0
OD_NORMAL: 1
OS_NORMAL: 1
OD_SUPERIOR_NASAL_RESTRICTION: 0
OS_INFERIOR_NASAL_RESTRICTION: 0
OS_SUPERIOR_TEMPORAL_RESTRICTION: 0
OS_SUPERIOR_NASAL_RESTRICTION: 0

## 2023-04-27 ASSESSMENT — REFRACTION_WEARINGRX
OS_AXIS: 086
OD_CYLINDER: +0.75
OD_AXIS: 092
OS_CYLINDER: +1.50
OS_ADD: +2.00
SPECS_TYPE: BIFOCAL
OS_SPHERE: -1.75
OD_SPHERE: -1.00
OD_ADD: +2.00

## 2023-04-27 ASSESSMENT — VISUAL ACUITY
OS_PH_CC: NO IMPROVEMENT
METHOD: SNELLEN - LINEAR
OS_CC: 20/40
OD_CC: 20/25
OS_CC: JAEGER 3
OD_CC: JAEGER 3
CORRECTION_TYPE: GLASSES

## 2023-04-27 ASSESSMENT — SLIT LAMP EXAM - LIDS
COMMENTS: MEIBOMIAN GLAND DYSFUNCTION
COMMENTS: MEIBOMIAN GLAND DYSFUNCTION

## 2023-04-27 ASSESSMENT — EXTERNAL EXAM - RIGHT EYE: OD_EXAM: NORMAL

## 2023-04-27 ASSESSMENT — TONOMETRY
OS_IOP_MMHG: 12
OD_IOP_MMHG: 12

## 2023-04-27 ASSESSMENT — EXTERNAL EXAM - LEFT EYE: OS_EXAM: NORMAL

## 2023-05-02 ENCOUNTER — OFFICE VISIT (OUTPATIENT)
Dept: INTERNAL MEDICINE | Age: 58
End: 2023-05-02

## 2023-05-02 VITALS
HEART RATE: 72 BPM | WEIGHT: 177 LBS | HEIGHT: 61 IN | SYSTOLIC BLOOD PRESSURE: 116 MMHG | RESPIRATION RATE: 16 BRPM | DIASTOLIC BLOOD PRESSURE: 80 MMHG | BODY MASS INDEX: 33.42 KG/M2 | TEMPERATURE: 97.2 F

## 2023-05-02 DIAGNOSIS — E11.8 DM (DIABETES MELLITUS), TYPE 2 WITH COMPLICATIONS (CMD): Primary | ICD-10-CM

## 2023-05-02 DIAGNOSIS — Z12.31 ENCOUNTER FOR SCREENING MAMMOGRAM FOR MALIGNANT NEOPLASM OF BREAST: ICD-10-CM

## 2023-05-02 PROCEDURE — 3074F SYST BP LT 130 MM HG: CPT | Performed by: FAMILY MEDICINE

## 2023-05-02 PROCEDURE — 3079F DIAST BP 80-89 MM HG: CPT | Performed by: FAMILY MEDICINE

## 2023-05-02 PROCEDURE — 99214 OFFICE O/P EST MOD 30 MIN: CPT | Performed by: FAMILY MEDICINE

## 2023-05-02 RX ORDER — BLOOD-GLUCOSE METER
1 EACH MISCELLANEOUS
Qty: 1 KIT | Refills: 1 | Status: SHIPPED | OUTPATIENT
Start: 2023-05-02 | End: 2023-05-04

## 2023-05-02 RX ORDER — LANCING DEVICE/LANCETS
KIT MISCELLANEOUS
Qty: 1 KIT | Refills: 1 | Status: SHIPPED | OUTPATIENT
Start: 2023-05-02 | End: 2023-05-04

## 2023-05-02 ASSESSMENT — PATIENT HEALTH QUESTIONNAIRE - PHQ9
2. FEELING DOWN, DEPRESSED OR HOPELESS: NOT AT ALL
SUM OF ALL RESPONSES TO PHQ9 QUESTIONS 1 AND 2: 0
SUM OF ALL RESPONSES TO PHQ9 QUESTIONS 1 AND 2: 0
CLINICAL INTERPRETATION OF PHQ2 SCORE: NO FURTHER SCREENING NEEDED
1. LITTLE INTEREST OR PLEASURE IN DOING THINGS: NOT AT ALL

## 2023-05-04 ENCOUNTER — TELEPHONE (OUTPATIENT)
Dept: INTERNAL MEDICINE | Age: 58
End: 2023-05-04

## 2023-05-04 ENCOUNTER — TELEPHONE (OUTPATIENT)
Dept: FAMILY MEDICINE | Age: 58
End: 2023-05-04

## 2023-05-04 DIAGNOSIS — N64.4 BREAST PAIN: Primary | ICD-10-CM

## 2023-05-04 RX ORDER — GLUCOSAMINE HCL/CHONDROITIN SU 500-400 MG
CAPSULE ORAL
Qty: 100 STRIP | Refills: 3 | Status: SHIPPED | OUTPATIENT
Start: 2023-05-04

## 2023-05-04 RX ORDER — LANCETS 30 GAUGE
EACH MISCELLANEOUS
Qty: 100 EACH | Refills: 3 | Status: SHIPPED | OUTPATIENT
Start: 2023-05-04

## 2023-05-06 DIAGNOSIS — F33.0 MILD EPISODE OF RECURRENT MAJOR DEPRESSIVE DISORDER (CMD): ICD-10-CM

## 2023-05-09 ENCOUNTER — TELEPHONE (OUTPATIENT)
Dept: MAMMOGRAPHY | Age: 58
End: 2023-05-09

## 2023-05-09 DIAGNOSIS — N64.4 BREAST PAIN: Primary | ICD-10-CM

## 2023-06-01 ENCOUNTER — APPOINTMENT (OUTPATIENT)
Dept: UROLOGY | Age: 58
End: 2023-06-01

## 2023-07-14 ENCOUNTER — E-ADVICE (OUTPATIENT)
Dept: INTERNAL MEDICINE | Age: 58
End: 2023-07-14

## 2023-07-26 ENCOUNTER — TELEPHONE (OUTPATIENT)
Dept: INTERNAL MEDICINE | Age: 58
End: 2023-07-26

## 2023-10-27 ENCOUNTER — TELEPHONE (OUTPATIENT)
Dept: OPHTHALMOLOGY | Age: 58
End: 2023-10-27

## 2024-01-26 ENCOUNTER — HOSPITAL ENCOUNTER (EMERGENCY)
Age: 59
Discharge: HOME | End: 2024-01-26
Attending: EMERGENCY MEDICINE
Payer: MEDICAID

## 2024-01-26 ENCOUNTER — APPOINTMENT (OUTPATIENT)
Dept: CT IMAGING | Age: 59
End: 2024-01-26
Attending: EMERGENCY MEDICINE
Payer: MEDICAID

## 2024-01-26 VITALS
HEIGHT: 61 IN | OXYGEN SATURATION: 99 % | BODY MASS INDEX: 33.61 KG/M2 | RESPIRATION RATE: 16 BRPM | HEART RATE: 65 BPM | SYSTOLIC BLOOD PRESSURE: 133 MMHG | DIASTOLIC BLOOD PRESSURE: 79 MMHG | WEIGHT: 178 LBS | TEMPERATURE: 98 F

## 2024-01-26 DIAGNOSIS — N20.0 KIDNEY STONE: Primary | ICD-10-CM

## 2024-01-26 LAB
ALBUMIN SERPL-MCNC: 3.6 G/DL (ref 3.4–5)
ALBUMIN/GLOB SERPL: 0.9 {RATIO} (ref 1–2)
ALP LIVER SERPL-CCNC: 106 U/L
ANION GAP SERPL CALC-SCNC: 7 MMOL/L (ref 0–18)
AST SERPL-CCNC: 14 U/L (ref 15–37)
BASOPHILS # BLD AUTO: 0.05 X10(3) UL (ref 0–0.2)
BASOPHILS NFR BLD AUTO: 0.4 %
BILIRUB SERPL-MCNC: 0.4 MG/DL (ref 0.1–2)
BILIRUB UR QL STRIP.AUTO: NEGATIVE
BUN BLD-MCNC: 13 MG/DL (ref 9–23)
CALCIUM BLD-MCNC: 9 MG/DL (ref 8.5–10.1)
CHLORIDE SERPL-SCNC: 109 MMOL/L (ref 98–112)
CO2 SERPL-SCNC: 24 MMOL/L (ref 21–32)
COLOR UR AUTO: YELLOW
CREAT BLD-MCNC: 0.82 MG/DL
EGFRCR SERPLBLD CKD-EPI 2021: 84 ML/MIN/1.73M2 (ref 60–?)
EOSINOPHIL # BLD AUTO: 0.12 X10(3) UL (ref 0–0.7)
EOSINOPHIL NFR BLD AUTO: 0.9 %
ERYTHROCYTE [DISTWIDTH] IN BLOOD BY AUTOMATED COUNT: 13.2 %
GLOBULIN PLAS-MCNC: 4.2 G/DL (ref 2.8–4.4)
GLUCOSE BLD-MCNC: 140 MG/DL (ref 70–99)
GLUCOSE UR STRIP.AUTO-MCNC: NEGATIVE MG/DL
HCT VFR BLD AUTO: 40.2 %
HGB BLD-MCNC: 13.1 G/DL
IMM GRANULOCYTES # BLD AUTO: 0.06 X10(3) UL (ref 0–1)
IMM GRANULOCYTES NFR BLD: 0.5 %
LEUKOCYTE ESTERASE UR QL STRIP.AUTO: NEGATIVE
LYMPHOCYTES # BLD AUTO: 2.89 X10(3) UL (ref 1–4)
LYMPHOCYTES NFR BLD AUTO: 21.9 %
MCH RBC QN AUTO: 28.5 PG (ref 26–34)
MCHC RBC AUTO-ENTMCNC: 32.6 G/DL (ref 31–37)
MCV RBC AUTO: 87.6 FL
MONOCYTES # BLD AUTO: 0.58 X10(3) UL (ref 0.1–1)
MONOCYTES NFR BLD AUTO: 4.4 %
NEUTROPHILS # BLD AUTO: 9.51 X10 (3) UL (ref 1.5–7.7)
NEUTROPHILS # BLD AUTO: 9.51 X10(3) UL (ref 1.5–7.7)
NEUTROPHILS NFR BLD AUTO: 71.9 %
NITRITE UR QL STRIP.AUTO: NEGATIVE
OSMOLALITY SERPL CALC.SUM OF ELEC: 292 MOSM/KG (ref 275–295)
PH UR STRIP.AUTO: 5 [PH] (ref 5–8)
PLATELET # BLD AUTO: 344 10(3)UL (ref 150–450)
POTASSIUM SERPL-SCNC: 4.2 MMOL/L (ref 3.5–5.1)
PROT SERPL-MCNC: 7.8 G/DL (ref 6.4–8.2)
RBC # BLD AUTO: 4.59 X10(6)UL
RBC #/AREA URNS AUTO: >10 /HPF
SODIUM SERPL-SCNC: 140 MMOL/L (ref 136–145)
SP GR UR STRIP.AUTO: >=1.03 (ref 1–1.03)
UROBILINOGEN UR STRIP.AUTO-MCNC: 0.2 MG/DL
WBC # BLD AUTO: 13.2 X10(3) UL (ref 4–11)
YEAST UR QL: PRESENT /HPF

## 2024-01-26 PROCEDURE — 81015 MICROSCOPIC EXAM OF URINE: CPT | Performed by: EMERGENCY MEDICINE

## 2024-01-26 PROCEDURE — 74176 CT ABD & PELVIS W/O CONTRAST: CPT | Performed by: EMERGENCY MEDICINE

## 2024-01-26 PROCEDURE — 99284 EMERGENCY DEPT VISIT MOD MDM: CPT

## 2024-01-26 PROCEDURE — 80053 COMPREHEN METABOLIC PANEL: CPT | Performed by: EMERGENCY MEDICINE

## 2024-01-26 PROCEDURE — 81001 URINALYSIS AUTO W/SCOPE: CPT | Performed by: EMERGENCY MEDICINE

## 2024-01-26 PROCEDURE — 96374 THER/PROPH/DIAG INJ IV PUSH: CPT

## 2024-01-26 PROCEDURE — 99285 EMERGENCY DEPT VISIT HI MDM: CPT

## 2024-01-26 PROCEDURE — 85025 COMPLETE CBC W/AUTO DIFF WBC: CPT | Performed by: EMERGENCY MEDICINE

## 2024-01-26 RX ORDER — HYDROCODONE BITARTRATE AND ACETAMINOPHEN 5; 325 MG/1; MG/1
1-2 TABLET ORAL EVERY 6 HOURS PRN
Qty: 10 TABLET | Refills: 0 | Status: SHIPPED | OUTPATIENT
Start: 2024-01-26 | End: 2024-01-31

## 2024-01-26 RX ORDER — KETOROLAC TROMETHAMINE 15 MG/ML
15 INJECTION, SOLUTION INTRAMUSCULAR; INTRAVENOUS ONCE
Status: COMPLETED | OUTPATIENT
Start: 2024-01-26 | End: 2024-01-26

## 2024-01-26 RX ORDER — TAMSULOSIN HYDROCHLORIDE 0.4 MG/1
0.4 CAPSULE ORAL DAILY
Qty: 7 CAPSULE | Refills: 0 | Status: SHIPPED | OUTPATIENT
Start: 2024-01-26 | End: 2024-02-02

## 2024-01-26 RX ORDER — KETOROLAC TROMETHAMINE 10 MG/1
10 TABLET, FILM COATED ORAL EVERY 6 HOURS PRN
Qty: 20 TABLET | Refills: 0 | Status: SHIPPED | OUTPATIENT
Start: 2024-01-26 | End: 2024-01-31

## 2024-01-26 RX ORDER — ONDANSETRON 4 MG/1
4 TABLET, ORALLY DISINTEGRATING ORAL EVERY 4 HOURS PRN
Qty: 10 TABLET | Refills: 0 | Status: SHIPPED | OUTPATIENT
Start: 2024-01-26 | End: 2024-02-02

## 2024-01-26 NOTE — ED PROVIDER NOTES
Patient Seen in: [facility] In [place]      History     Chief Complaint   Patient presents with    Abdomen/Flank Pain     Stated Complaint: L flank pain    Subjective:   HPI  58-year-old female who presents to the ER complaint having left flank pain.  The patient had history of kidney stones.  She said her symptoms have been present since early this morning at 10 AM.  She had vomiting and chills but no fever.  She had burning with urination.  Reviewing her record she has had a complex ovarian cyst and she was seen on 4/20/2023.  On 4/18/2023 she had left flank pain with a complex renal cyst.      Objective:   Past Medical History:   Diagnosis Date    Anxiety state     Borderline diabetes     Calculus of kidney     Esophageal reflux     Essential hypertension     GERD     intermittent    High blood pressure     High cholesterol     HYPERLIPIDEMIA     Hyperlipidemia     Kidney stone     PONV (postoperative nausea and vomiting)     Prediabetes     diet control no medications              Past Surgical History:   Procedure Laterality Date    CHOLECYSTECTOMY      HYSTERECTOMY      LAPAROSCOPY,DIAGNOSTIC      left overy removed    LUMPECTOMY RIGHT      x 2    OTHER      bladder lift    TOTAL ABDOM HYSTERECTOMY                  Social History     Socioeconomic History    Marital status:    Tobacco Use    Smoking status: Never    Smokeless tobacco: Never   Vaping Use    Vaping Use: Never used   Substance and Sexual Activity    Alcohol use: No    Drug use: No              Review of Systems    Positive for stated complaint: L flank pain  Other systems are as noted in HPI.  Constitutional and vital signs reviewed.      All other systems reviewed and negative except as noted above.    Physical Exam     ED Triage Vitals [01/26/24 1323]   /75   Pulse 71   Resp 18   Temp 97.9 °F (36.6 °C)   Temp src Temporal   SpO2 98 %   O2 Device None (Room air)       Current:/79   Pulse 65   Temp 97.9  °F (36.6 °C) (Temporal)   Resp 16   Ht 154.9 cm (5' 1\")   Wt 80.7 kg   SpO2 99%   BMI 33.63 kg/m²         Physical Exam  General: This a pleasant nontoxic appearing patient in no apparent distress alert and oriented ×3  HEENT: Pupils are equal reactive to light.  Extra ocular motions are intact.  No scleral icterus or conjunctival pallor.  Lungs: Clear to auscultation bilaterally.  No wheezes, rhonchi, or rales appreciated.  No accessory muscle use noted for breathing.  Cardiac: Regular rate and rhythm.  Normal S1 and 2 without murmurs or ectopy appreciated  Abdomen: Soft on examination without tenderness to deep palpation or to percussion.  No masses appreciated.  Bowel sounds are normoactive.  There are some left CVA discomfort on percussion.    Extremities: No cyanosis, no edema or clubbing.  Pulses are +2.  Full range of motion is noted of the extremities without deformities.  No tenderness.  Neurologically intact.       ED Course     Labs Reviewed   URINALYSIS WITH CULTURE REFLEX - Abnormal; Notable for the following components:       Result Value    Clarity Urine Cloudy (*)     Ketones Urine Trace (*)     Blood Urine Large (*)     Protein Urine 100 mg/dL (*)     All other components within normal limits   COMP METABOLIC PANEL (14) - Abnormal; Notable for the following components:    Glucose 140 (*)     AST 14 (*)     A/G Ratio 0.9 (*)     All other components within normal limits   UA MICROSCOPIC ONLY, URINE - Abnormal; Notable for the following components:    RBC Urine >10 (*)     Squamous Epi. Cells Few (*)     Ca Oxalate Crystals Few (*)     Yeast Urine Present (*)     All other components within normal limits   CBC W/ DIFFERENTIAL - Abnormal; Notable for the following components:    WBC 13.2 (*)     Neutrophil Absolute Prelim 9.51 (*)     Neutrophil Absolute 9.51 (*)     All other components within normal limits   CBC WITH DIFFERENTIAL WITH PLATELET    Narrative:     The following orders were created  for panel order CBC With Differential With Platelet.  Procedure                               Abnormality         Status                     ---------                               -----------         ------                     CBC W/ DIFFERENTIAL[[id number]]          Abnormal            Final result                 Please view results for these tests on the individual orders.   RAINBOW DRAW LAVENDER   RAINBOW DRAW LIGHT GREEN                      MDM    Differential diagnose includes but is not limited to kidney stone, cyst in the kidney, ovarian cyst, diverticulitis, pyelonephritis.  The patient had laboratories sent.  CAT scan will be performed.  She received Toradol for pain control.  Glucose of 140.  AST of 14.  Urinalysis greater than 10 red blood cells per high-power field.  White count 13,200.  CT scan performed.  I personally reviewed the radiographs my individual interpretation shows left-sided kidney stone.  No bowel obstruction.  No free air.  I also reviewed the official report  The stone was 5 mm.    Stones up to 5 mm pass 80% of the time.  She will be written for pain medications as well as Flomax and Zofran.  Follow-up with her primary care physician and urology as an outpatient.  Return if symptoms progress or worsen such as fevers, intractable vomiting, or intractable pain.  Told to push fluids.     Note to Patient  The 21st Century Cures Act makes medical notes like these available to patients in the interest of transparency. However, be advised this is a medical document and is intended as kztm-rs-ceeu communication; it is written in medical language and may appear blunt, direct, or contain abbreviations or verbiage that are unfamiliar. Medical documents are intended to carry relevant information, facts as evident, and the clinical opinion of the practitioner.  Patient was evaluated and a screening exam was performed.   As a treating physician attending to the patient, I determined, within  reasonable clinical confidence and prior to discharge, that an emergency medical condition was not or was no longer present.  There was no indication for further evaluation, treatment or admission on an emergency basis.  Comprehensive verbal and written discharge and follow-up instructions were provided to help prevent relapse or worsening.  Patient was instructed to follow-up with their primary care provider for further evaluation and treatment, but to return immediately to the ER for worsening, concerning, new, changing or persisting symptoms.  I discussed the case with the patient and they had no questions, complaints, or concerns.  Patient felt comfortable going home.  ^^Please note that this report has been produced using speech recognition software and may contain errors related to that system including, but not limited to, errors in grammar, punctuation, and spelling, as well as words and phrases that possibly may have been recognized inappropriately.  If there are any questions or concerns, contact the dictating provider for clarification.            Medical Decision Making      Disposition and Plan     Clinical Impression:  1. Kidney stone         Disposition:  Discharge  1/26/2024  3:44 pm    Follow-up:  Shauna Jama MD  [address] [facility] [id number] [phone]    Schedule an appointment as soon as possible for a visit in 2 day(s)      Mikey Capone MD  [address]  [facility] [address]  [facility] [id number] [phone]    Schedule an appointment as soon as possible for a visit in 1 week(s)            Medications Prescribed:  Current Discharge Medication List        START taking these medications    Details   tamsulosin (FLOMAX) 0.4 MG Oral Cap Take 1 capsule (0.4 mg total) by mouth daily for 7 days.  Qty: 7 capsule, Refills: 0      ondansetron 4 MG Oral Tablet Dispersible Take 1 tablet (4 mg total) by mouth every 4 (four) hours as needed for Nausea.  Qty: 10 tablet, Refills: 0      Ketorolac  Tromethamine 10 MG Oral Tab Take 1 tablet (10 mg total) by mouth every 6 (six) hours as needed.  Qty: 20 tablet, Refills: 0      HYDROcodone-acetaminophen 5-325 MG Oral Tab Take 1-2 tablets by mouth every 6 (six) hours as needed for Pain.  Qty: 10 tablet, Refills: 0    Associated Diagnoses: Kidney stone

## 2024-01-26 NOTE — ED INITIAL ASSESSMENT (HPI)
Left flank pain started today at 10am, vomiting and chills, history of kidney stone, burning with urination, chills

## 2024-03-06 ENCOUNTER — EXTERNAL RECORD (OUTPATIENT)
Dept: HEALTH INFORMATION MANAGEMENT | Facility: OTHER | Age: 59
End: 2024-03-06

## 2024-05-12 DIAGNOSIS — E78.5 HYPERLIPIDEMIA, UNSPECIFIED HYPERLIPIDEMIA TYPE: ICD-10-CM

## 2024-05-20 RX ORDER — ATORVASTATIN CALCIUM 40 MG/1
40 TABLET, FILM COATED ORAL DAILY
Qty: 90 TABLET | Refills: 3 | OUTPATIENT
Start: 2024-05-20

## 2024-06-26 ENCOUNTER — HOSPITAL ENCOUNTER (OUTPATIENT)
Facility: HOSPITAL | Age: 59
Setting detail: OBSERVATION
Discharge: HOME | End: 2024-06-27
Attending: EMERGENCY MEDICINE | Admitting: HOSPITALIST
Payer: MEDICAID

## 2024-06-26 ENCOUNTER — APPOINTMENT (OUTPATIENT)
Dept: GENERAL RADIOLOGY | Age: 59
End: 2024-06-26
Attending: EMERGENCY MEDICINE
Payer: MEDICAID

## 2024-06-26 DIAGNOSIS — R07.9 ACUTE CHEST PAIN: Primary | ICD-10-CM

## 2024-06-26 LAB
ALBUMIN SERPL-MCNC: 3.9 G/DL (ref 3.4–5)
ALBUMIN/GLOB SERPL: 1 {RATIO} (ref 1–2)
ALP LIVER SERPL-CCNC: 103 U/L
ALT SERPL-CCNC: 29 U/L
ANION GAP SERPL CALC-SCNC: 4 MMOL/L (ref 0–18)
AST SERPL-CCNC: 16 U/L (ref 15–37)
BASOPHILS # BLD AUTO: 0.04 X10(3) UL (ref 0–0.2)
BASOPHILS NFR BLD AUTO: 0.4 %
BILIRUB SERPL-MCNC: 0.9 MG/DL (ref 0.1–2)
BUN BLD-MCNC: 11 MG/DL (ref 9–23)
CALCIUM BLD-MCNC: 9.5 MG/DL (ref 8.5–10.1)
CHLORIDE SERPL-SCNC: 108 MMOL/L (ref 98–112)
CO2 SERPL-SCNC: 25 MMOL/L (ref 21–32)
CREAT BLD-MCNC: 0.75 MG/DL
EGFRCR SERPLBLD CKD-EPI 2021: 93 ML/MIN/1.73M2 (ref 60–?)
EOSINOPHIL # BLD AUTO: 0.11 X10(3) UL (ref 0–0.7)
EOSINOPHIL NFR BLD AUTO: 1.1 %
ERYTHROCYTE [DISTWIDTH] IN BLOOD BY AUTOMATED COUNT: 13.5 %
GLOBULIN PLAS-MCNC: 3.8 G/DL (ref 2.8–4.4)
GLUCOSE BLD-MCNC: 105 MG/DL (ref 70–99)
HCT VFR BLD AUTO: 39.1 %
HGB BLD-MCNC: 13.3 G/DL
IMM GRANULOCYTES # BLD AUTO: 0.02 X10(3) UL (ref 0–1)
IMM GRANULOCYTES NFR BLD: 0.2 %
LYMPHOCYTES # BLD AUTO: 3.29 X10(3) UL (ref 1–4)
LYMPHOCYTES NFR BLD AUTO: 31.9 %
MCH RBC QN AUTO: 29 PG (ref 26–34)
MCHC RBC AUTO-ENTMCNC: 34 G/DL (ref 31–37)
MCV RBC AUTO: 85.4 FL
MONOCYTES # BLD AUTO: 0.57 X10(3) UL (ref 0.1–1)
MONOCYTES NFR BLD AUTO: 5.5 %
NEUTROPHILS # BLD AUTO: 6.27 X10 (3) UL (ref 1.5–7.7)
NEUTROPHILS # BLD AUTO: 6.27 X10(3) UL (ref 1.5–7.7)
NEUTROPHILS NFR BLD AUTO: 60.9 %
OSMOLALITY SERPL CALC.SUM OF ELEC: 284 MOSM/KG (ref 275–295)
PLATELET # BLD AUTO: 331 10(3)UL (ref 150–450)
POTASSIUM SERPL-SCNC: 3.9 MMOL/L (ref 3.5–5.1)
PROT SERPL-MCNC: 7.7 G/DL (ref 6.4–8.2)
RBC # BLD AUTO: 4.58 X10(6)UL
SODIUM SERPL-SCNC: 137 MMOL/L (ref 136–145)
TROPONIN I SERPL HS-MCNC: 3 NG/L
TROPONIN I SERPL HS-MCNC: 4 NG/L
WBC # BLD AUTO: 10.3 X10(3) UL (ref 4–11)

## 2024-06-26 PROCEDURE — 71045 X-RAY EXAM CHEST 1 VIEW: CPT | Performed by: EMERGENCY MEDICINE

## 2024-06-26 PROCEDURE — 99223 1ST HOSP IP/OBS HIGH 75: CPT | Performed by: INTERNAL MEDICINE

## 2024-06-26 RX ORDER — ASPIRIN 81 MG/1
324 TABLET, CHEWABLE ORAL ONCE
Status: COMPLETED | OUTPATIENT
Start: 2024-06-26 | End: 2024-06-26

## 2024-06-26 RX ORDER — ATORVASTATIN CALCIUM 40 MG/1
40 TABLET, FILM COATED ORAL DAILY
Status: DISCONTINUED | OUTPATIENT
Start: 2024-06-27 | End: 2024-06-26

## 2024-06-26 RX ORDER — ENEMA 19; 7 G/133ML; G/133ML
1 ENEMA RECTAL ONCE AS NEEDED
Status: DISCONTINUED | OUTPATIENT
Start: 2024-06-26 | End: 2024-06-27

## 2024-06-26 RX ORDER — ALBUTEROL SULFATE 90 UG/1
2 AEROSOL, METERED RESPIRATORY (INHALATION) EVERY 6 HOURS PRN
COMMUNITY

## 2024-06-26 RX ORDER — ATORVASTATIN CALCIUM 40 MG/1
40 TABLET, FILM COATED ORAL NIGHTLY
Status: DISCONTINUED | OUTPATIENT
Start: 2024-06-26 | End: 2024-06-27

## 2024-06-26 RX ORDER — SERTRALINE HYDROCHLORIDE 25 MG/1
25 TABLET, FILM COATED ORAL NIGHTLY
Status: DISCONTINUED | OUTPATIENT
Start: 2024-06-26 | End: 2024-06-27

## 2024-06-26 RX ORDER — ENOXAPARIN SODIUM 100 MG/ML
40 INJECTION SUBCUTANEOUS DAILY
Status: DISCONTINUED | OUTPATIENT
Start: 2024-06-27 | End: 2024-06-27

## 2024-06-26 RX ORDER — ACETAMINOPHEN 500 MG
500 TABLET ORAL EVERY 4 HOURS PRN
Status: DISCONTINUED | OUTPATIENT
Start: 2024-06-26 | End: 2024-06-27

## 2024-06-26 RX ORDER — MELATONIN
3 NIGHTLY PRN
Status: DISCONTINUED | OUTPATIENT
Start: 2024-06-26 | End: 2024-06-27

## 2024-06-26 RX ORDER — LOSARTAN POTASSIUM 100 MG/1
100 TABLET ORAL DAILY
Status: DISCONTINUED | OUTPATIENT
Start: 2024-06-27 | End: 2024-06-27

## 2024-06-26 RX ORDER — POLYETHYLENE GLYCOL 3350 17 G/17G
17 POWDER, FOR SOLUTION ORAL DAILY PRN
Status: DISCONTINUED | OUTPATIENT
Start: 2024-06-26 | End: 2024-06-27

## 2024-06-26 RX ORDER — ALBUTEROL SULFATE 90 UG/1
2 AEROSOL, METERED RESPIRATORY (INHALATION) EVERY 6 HOURS PRN
Status: DISCONTINUED | OUTPATIENT
Start: 2024-06-26 | End: 2024-06-27

## 2024-06-26 RX ORDER — BISACODYL 10 MG
10 SUPPOSITORY, RECTAL RECTAL
Status: DISCONTINUED | OUTPATIENT
Start: 2024-06-26 | End: 2024-06-27

## 2024-06-26 RX ORDER — PROCHLORPERAZINE EDISYLATE 5 MG/ML
5 INJECTION INTRAMUSCULAR; INTRAVENOUS EVERY 8 HOURS PRN
Status: DISCONTINUED | OUTPATIENT
Start: 2024-06-26 | End: 2024-06-27

## 2024-06-26 RX ORDER — ONDANSETRON 2 MG/ML
4 INJECTION INTRAMUSCULAR; INTRAVENOUS EVERY 6 HOURS PRN
Status: DISCONTINUED | OUTPATIENT
Start: 2024-06-26 | End: 2024-06-27

## 2024-06-26 RX ORDER — SENNOSIDES 8.6 MG
17.2 TABLET ORAL NIGHTLY PRN
Status: DISCONTINUED | OUTPATIENT
Start: 2024-06-26 | End: 2024-06-27

## 2024-06-26 NOTE — ED INITIAL ASSESSMENT (HPI)
Pt states last night having chest pressure and left upper arm pain while at rest   Chest pressure again today , states had the same sx for 3 days one week ago

## 2024-06-26 NOTE — ED QUICK NOTES
Orders for admission, patient is aware of plan and ready to go upstairs. Any questions, please call ED ALEX Mccrary at extension [phone].     Patient Covid vaccination status: Fully vaccinated     COVID Test Ordered in ED: None    COVID Suspicion at Admission: N/A    Running Infusions:  None    Mental Status/LOC at time of transport: AOx3    Other pertinent information:   CIWA score: N/A   NIH score:  N/A

## 2024-06-26 NOTE — ED PROVIDER NOTES
Patient Seen in: [facility] In [place]      History     Chief Complaint   Patient presents with    Chest Pain Angina     Stated Complaint: chest pressure and l arm numbness intermettent over past few days    Subjective:   HPI    58-year-old female with a history of borderline diabetes hypertension hyperlipidemia [facility] ED for evaluation for chest pressure.  Start about a couple days ago.  Associated left arm pain and numbness.  Has been on and off for the past several days.  No shortness of breath no palpitations syncope or dizziness.  She does not have a PCP.  No other complaints.    Objective:   Past Medical History:    Anxiety state    Borderline diabetes    Calculus of kidney    Esophageal reflux    Essential hypertension    GERD    intermittent    High blood pressure    High cholesterol    HYPERLIPIDEMIA    Hyperlipidemia    Kidney stone    PONV (postoperative nausea and vomiting)    Prediabetes    diet control no medications              Past Surgical History:   Procedure Laterality Date    Cholecystectomy      Hysterectomy      Laparoscopy,diagnostic      left overy removed    Lumpectomy right      x 2    Other      bladder lift    Total abdom hysterectomy                  Social History     Socioeconomic History    Marital status:    Tobacco Use    Smoking status: Never    Smokeless tobacco: Never   Vaping Use    Vaping status: Never Used   Substance and Sexual Activity    Alcohol use: No    Drug use: No     Social Determinants of Health      [facility]    Social Connections    [facility]    Housing Stability              Review of Systems    Positive for stated Chief Complaint: Chest Pain Angina    Other systems are as noted in HPI.  Constitutional and vital signs reviewed.      All other systems reviewed and negative except as noted  above.    Physical Exam     ED Triage Vitals [06/26/24 1451]   /74   Pulse 71   Resp 16   Temp 99 °F (37.2 °C)   Temp src    SpO2 99 %   O2 Device None (Room air)       Current Vitals:   Vital Signs  BP: 129/73  Pulse: 68  Resp: 16  Temp: 99 °F (37.2 °C)    Oxygen Therapy  SpO2: 97 %  O2 Device: None (Room air)            Physical Exam  Vitals and nursing note reviewed.   Constitutional:       General: She is not in acute distress.     Appearance: She is well-developed. She is not toxic-appearing.   HENT:      Head: Normocephalic and atraumatic.   Eyes:      General: No scleral icterus.     Conjunctiva/sclera: Conjunctivae normal.   Cardiovascular:      Rate and Rhythm: Normal rate.      Heart sounds: Normal heart sounds.   Pulmonary:      Effort: Pulmonary effort is normal. No respiratory distress.   Abdominal:      General: There is no distension.   Musculoskeletal:         General: No tenderness. Normal range of motion.      Cervical back: Normal range of motion and neck supple.   Skin:     General: Skin is warm and dry.      Findings: No rash.   Neurological:      Mental Status: She is alert and oriented to person, place, and time.      Motor: No abnormal muscle tone.      Coordination: Coordination normal.   Psychiatric:         Behavior: Behavior normal.              ED Course     Labs Reviewed   COMP METABOLIC PANEL (14) - Abnormal; Notable for the following components:       Result Value    Glucose 105 (*)     All other components within normal limits   TROPONIN I HIGH SENSITIVITY - Normal   TROPONIN I HIGH SENSITIVITY - Normal   CBC WITH DIFFERENTIAL WITH PLATELET    Narrative:     The following orders were created for panel order CBC With Differential With Platelet.  Procedure                               Abnormality         Status                     ---------                               -----------         ------                     CBC W/ DIFFERENTIAL[[id number]]                               Final result                 Please view results for these tests on the individual orders.   RAINBOW DRAW LAVENDER   RAINBOW DRAW LIGHT GREEN   RAINBOW DRAW BLUE   CBC W/ DIFFERENTIAL     EKG    Rate, intervals and axes as noted on EKG Report.  Rate: 58  Rhythm: Sinus Rhythm  Reading: Normal sinus rhythm normal EKG                           [facility]         Admission disposition: 6/26/2024  5:58 PM           -Tracing on cardiac monitor and pulse oximetry was reviewed by myself.   -The cardiac monitor revealed normal sinus rhythm as interpreted by me. The cardiac monitor was ordered to monitor the patient for dysrhythmia  -Pulse oximetry was interpreted by me and was normal.  Pulse oximeter was ordered to monitor patient for hypoxia.         -Comorbidities did add complexity to the management are mentioned in the HPI above        -I personally reviewed the prior external notes and the medical record to obtain additional history reviewed last ED visit January 2024 patient presented with left flank pain diagnosed with kidney stone        -DDX: Includes but not limited to reflux, chest wall pain, acute coronary syndrome-        -I personally reviewed the radiographs findings and they show no acute disease  Please refer to radiology report for official interpretation    Labs Reviewed   COMP METABOLIC PANEL (14) - Abnormal; Notable for the following components:       Result Value    Glucose 105 (*)     All other components within normal limits   TROPONIN I HIGH SENSITIVITY - Normal   TROPONIN I HIGH SENSITIVITY - Normal   CBC WITH DIFFERENTIAL WITH PLATELET    Narrative:     The following orders were created for panel order CBC With Differential With Platelet.  Procedure                               Abnormality         Status                     ---------                               -----------         ------                     CBC W/ DIFFERENTIAL[[id number]]                              Final result                 Please  view results for these tests on the individual orders.   RAINBOW DRAW LAVENDER   RAINBOW DRAW LIGHT GREEN   RAINBOW DRAW BLUE   CBC W/ DIFFERENTIAL     CBC CMP EKG troponin negative for any pattern of cardiac injury.  Heart score is greater than 3.  She does not have a PCP for follow-up.  Patient does have risk factors and some concerning symptoms although pain-free at this time.  She will be admitted for further workup.  Discussed with hospital service and cardiology for admission.                                 Medical Decision Making      Disposition and Plan     Clinical Impression:  1. Acute chest pain         Disposition:  Admit  6/26/2024  5:58 pm    Follow-up:  No follow-up provider specified.        Medications Prescribed:  Current Discharge Medication List                            Hospital Problems       Present on Admission  Date Reviewed: 7/1/2021            ICD-10-CM Noted POA    * (Principal) Acute chest pain R07.9 6/26/2024 Unknown

## 2024-06-27 ENCOUNTER — APPOINTMENT (OUTPATIENT)
Dept: CV DIAGNOSTICS | Facility: HOSPITAL | Age: 59
End: 2024-06-27
Attending: STUDENT IN AN ORGANIZED HEALTH CARE EDUCATION/TRAINING PROGRAM
Payer: MEDICAID

## 2024-06-27 ENCOUNTER — APPOINTMENT (OUTPATIENT)
Dept: CV DIAGNOSTICS | Facility: HOSPITAL | Age: 59
End: 2024-06-27
Attending: INTERNAL MEDICINE
Payer: MEDICAID

## 2024-06-27 VITALS
SYSTOLIC BLOOD PRESSURE: 105 MMHG | TEMPERATURE: 97 F | RESPIRATION RATE: 16 BRPM | OXYGEN SATURATION: 97 % | BODY MASS INDEX: 34 KG/M2 | WEIGHT: 179 LBS | DIASTOLIC BLOOD PRESSURE: 64 MMHG | HEART RATE: 70 BPM

## 2024-06-27 LAB
ALBUMIN SERPL-MCNC: 3.5 G/DL (ref 3.4–5)
ALBUMIN/GLOB SERPL: 1 {RATIO} (ref 1–2)
ALP LIVER SERPL-CCNC: 94 U/L
ALT SERPL-CCNC: 24 U/L
ANION GAP SERPL CALC-SCNC: 5 MMOL/L (ref 0–18)
AST SERPL-CCNC: 18 U/L (ref 15–37)
ATRIAL RATE: 58 BPM
BILIRUB SERPL-MCNC: 1.2 MG/DL (ref 0.1–2)
BUN BLD-MCNC: 14 MG/DL (ref 9–23)
CALCIUM BLD-MCNC: 8.9 MG/DL (ref 8.5–10.1)
CHLORIDE SERPL-SCNC: 110 MMOL/L (ref 98–112)
CO2 SERPL-SCNC: 25 MMOL/L (ref 21–32)
CREAT BLD-MCNC: 0.68 MG/DL
EGFRCR SERPLBLD CKD-EPI 2021: 102 ML/MIN/1.73M2 (ref 60–?)
ERYTHROCYTE [DISTWIDTH] IN BLOOD BY AUTOMATED COUNT: 13.2 %
GLOBULIN PLAS-MCNC: 3.4 G/DL (ref 2.8–4.4)
GLUCOSE BLD-MCNC: 105 MG/DL (ref 70–99)
HCT VFR BLD AUTO: 39 %
HGB BLD-MCNC: 12.7 G/DL
MAGNESIUM SERPL-MCNC: 2.3 MG/DL (ref 1.6–2.6)
MCH RBC QN AUTO: 28.3 PG (ref 26–34)
MCHC RBC AUTO-ENTMCNC: 32.6 G/DL (ref 31–37)
MCV RBC AUTO: 86.9 FL
OSMOLALITY SERPL CALC.SUM OF ELEC: 291 MOSM/KG (ref 275–295)
P AXIS: 34 DEGREES
P-R INTERVAL: 130 MS
PHOSPHATE SERPL-MCNC: 4.1 MG/DL (ref 2.5–4.9)
PLATELET # BLD AUTO: 286 10(3)UL (ref 150–450)
POTASSIUM SERPL-SCNC: 3.8 MMOL/L (ref 3.5–5.1)
PROT SERPL-MCNC: 6.9 G/DL (ref 6.4–8.2)
Q-T INTERVAL: 448 MS
QRS DURATION: 96 MS
QTC CALCULATION (BEZET): 439 MS
R AXIS: 43 DEGREES
RBC # BLD AUTO: 4.49 X10(6)UL
SODIUM SERPL-SCNC: 140 MMOL/L (ref 136–145)
T AXIS: 18 DEGREES
TSI SER-ACNC: 1.36 MIU/ML (ref 0.36–3.74)
VENTRICULAR RATE: 58 BPM
WBC # BLD AUTO: 10.2 X10(3) UL (ref 4–11)

## 2024-06-27 PROCEDURE — 93306 TTE W/DOPPLER COMPLETE: CPT | Performed by: INTERNAL MEDICINE

## 2024-06-27 PROCEDURE — 93350 STRESS TTE ONLY: CPT | Performed by: STUDENT IN AN ORGANIZED HEALTH CARE EDUCATION/TRAINING PROGRAM

## 2024-06-27 PROCEDURE — 93018 CV STRESS TEST I&R ONLY: CPT | Performed by: STUDENT IN AN ORGANIZED HEALTH CARE EDUCATION/TRAINING PROGRAM

## 2024-06-27 PROCEDURE — 99238 HOSP IP/OBS DSCHRG MGMT 30/<: CPT | Performed by: STUDENT IN AN ORGANIZED HEALTH CARE EDUCATION/TRAINING PROGRAM

## 2024-06-27 PROCEDURE — 93017 CV STRESS TEST TRACING ONLY: CPT | Performed by: STUDENT IN AN ORGANIZED HEALTH CARE EDUCATION/TRAINING PROGRAM

## 2024-06-27 NOTE — PLAN OF CARE
Iv removed, telemetry removed, discharge instructions given with verbal understanding. Patient to be transported by wheelchair by transport.       Cardiology is ok with discharge from their standpoint.   Dr. Cat is ok with discharge.     Problem: CARDIOVASCULAR - ADULT  Goal: Absence of cardiac arrhythmias or at baseline  Description: INTERVENTIONS:  - Continuous cardiac monitoring, monitor vital signs, obtain 12 lead EKG if indicated  - Evaluate effectiveness of antiarrhythmic and heart rate control medications as ordered  - Initiate emergency measures for life threatening arrhythmias  - Monitor electrolytes and administer replacement therapy as ordered  6/27/2024 1452 by Lily Soares RN  Outcome: Adequate for Discharge  6/27/2024 0959 by Lily Soares RN  Outcome: Progressing     Problem: METABOLIC/FLUID AND ELECTROLYTES - ADULT  Goal: Electrolytes maintained within normal limits  Description: INTERVENTIONS:  - Monitor labs and rhythm and assess patient for signs and symptoms of electrolyte imbalances  - Administer electrolyte replacement as ordered  - Monitor response to electrolyte replacements, including rhythm and repeat lab results as appropriate  6/27/2024 1452 by Lily Soares RN  Outcome: Adequate for Discharge  6/27/2024 0959 by Lily Soares RN  Outcome: Progressing     Problem: PAIN - ADULT  Goal: Verbalizes/displays adequate comfort level or patient's stated pain goal  Description: INTERVENTIONS:  - Encourage pt to monitor pain and request assistance  - Assess pain using appropriate pain scale  - Administer analgesics based on type and severity of pain and evaluate response  - Implement non-pharmacological measures as appropriate and evaluate response  - Consider cultural and social influences on pain and pain management  - Manage/alleviate anxiety  - Utilize distraction and/or relaxation techniques  - Monitor for opioid side effects  - Notify MD/LIP if interventions unsuccessful or patient  reports new pain  - Anticipate increased pain with activity and pre-medicate as appropriate  6/27/2024 1452 by Lily Soares, RN  Outcome: Adequate for Discharge  6/27/2024 0959 by Lily Soares RN  Outcome: Progressing     Problem: Patient/Family Goals  Goal: Patient/Family Long Term Goal  Description: Problem: Patient/Family Goals    Goal: Patient/Family Long Term Goal    Description: Description: Patient's Long Term Goal: \" stay healthy at home\"    Interventions:  -cardiology consult  -follow up labs  -echo  -take medications as prescribed  -attend follow up appointments as recommended  -diet as advised  -report new or worsening symptoms to physician    Outcome: Progressing    6/27/2024 1452 by Lily Soares RN  Outcome: Adequate for Discharge  6/27/2024 0959 by Lily Saores RN  Outcome: Progressing  Goal: Patient/Family Short Term Goal  Description: Patient's Short Term Goal:     6/26 NOC \" no more chest pain\"      Interventions:   -cardiology consult  -monitor tele  -echo  6/27/2024 1452 by Lily Soares, RN  Outcome: Adequate for Discharge  6/27/2024 0959 by Lily Soares, RN  Outcome: Progressing

## 2024-06-27 NOTE — PLAN OF CARE
Assumed care of patient at 0700.  Denies chest pain at this time and states very little numbness in left arm but, \"feels much better\"   Cardiology saw and stress/echo to be done if negative ok to DC from cards standpoint.       Problem: CARDIOVASCULAR - ADULT  Goal: Absence of cardiac arrhythmias or at baseline  Description: INTERVENTIONS:  - Continuous cardiac monitoring, monitor vital signs, obtain 12 lead EKG if indicated  - Evaluate effectiveness of antiarrhythmic and heart rate control medications as ordered  - Initiate emergency measures for life threatening arrhythmias  - Monitor electrolytes and administer replacement therapy as ordered  Outcome: Progressing     Problem: METABOLIC/FLUID AND ELECTROLYTES - ADULT  Goal: Electrolytes maintained within normal limits  Description: INTERVENTIONS:  - Monitor labs and rhythm and assess patient for signs and symptoms of electrolyte imbalances  - Administer electrolyte replacement as ordered  - Monitor response to electrolyte replacements, including rhythm and repeat lab results as appropriate  Outcome: Progressing     Problem: PAIN - ADULT  Goal: Verbalizes/displays adequate comfort level or patient's stated pain goal  Description: INTERVENTIONS:  - Encourage pt to monitor pain and request assistance  - Assess pain using appropriate pain scale  - Administer analgesics based on type and severity of pain and evaluate response  - Implement non-pharmacological measures as appropriate and evaluate response  - Consider cultural and social influences on pain and pain management  - Manage/alleviate anxiety  - Utilize distraction and/or relaxation techniques  - Monitor for opioid side effects  - Notify MD/LIP if interventions unsuccessful or patient reports new pain  - Anticipate increased pain with activity and pre-medicate as appropriate  Outcome: Progressing     Problem: Patient/Family Goals  Goal: Patient/Family Long Term Goal  Description: Problem: Patient/Family  Goals    Goal: Patient/Family Long Term Goal    Description: Description: Patient's Long Term Goal: \" stay healthy at home\"    Interventions:  -cardiology consult  -follow up labs  -echo  -take medications as prescribed  -attend follow up appointments as recommended  -diet as advised  -report new or worsening symptoms to physician    Outcome: Progressing    Outcome: Progressing  Goal: Patient/Family Short Term Goal  Description: Patient's Short Term Goal:     6/26 NOC \" no more chest pain\"      Interventions:   -cardiology consult  -monitor tele  -echo  Outcome: Progressing

## 2024-06-27 NOTE — CONSULTS
Cardiology Consultation      Melody Padron Patient Status:  Observation    1965 MRN [id number]   Location [facility] 0SW-A Attending Anni Cat MD   Hosp Day # 0 PCP No primary care provider on file.     Reason for Consultation:  Chest pain    History of Present Illness:  Melody Padron is a(n) 58 year old female with chronic medical conditions including hypertension, hyperlipidemia, prediabetes, anxiety who presents with complaint of left arm numbness as well as chest pressure which she felt yesterday.  She stated initially the symptoms started approximately 1 week ago with left cheek discomfort as well as left arm numbness.  All of these episodes have been unrelated to activity and unrelated to rest.  Patient denies diaphoresis, palpitations, shortness of breath, fevers, chills, cough, nausea, emesis.  Denies tobacco use history.  Denies premature cardiac coronary history in her family.  Cardiology asked to evaluate given chest pressure.    History:  Past Medical History:    Anxiety state    Borderline diabetes    Calculus of kidney    Esophageal reflux    Essential hypertension    GERD    intermittent    High blood pressure    High cholesterol    HYPERLIPIDEMIA    Hyperlipidemia    Kidney stone    PONV (postoperative nausea and vomiting)    Prediabetes    diet control no medications     Past Surgical History:   Procedure Laterality Date    Cholecystectomy      Hysterectomy      Laparoscopy,diagnostic      left overy removed    Lumpectomy right      x 2    Other      bladder lift    Total abdom hysterectomy       Family History   Problem Relation Age of Onset    Hypertension Father     Cancer Father         stomach    Cancer Mother         breast CA    Diabetes Brother       reports that she has never smoked. She has never used smokeless tobacco. She reports that she does not drink alcohol and does not use drugs.    Allergies:  No Known Allergies    Medications:    Current  Facility-Administered Medications:     enoxaparin (Lovenox) 40 MG/0.4ML SUBQ injection 40 mg, 40 mg, Subcutaneous, Daily    acetaminophen (Tylenol Extra Strength) tab 500 mg, 500 mg, Oral, Q4H PRN    melatonin tab 3 mg, 3 mg, Oral, Nightly PRN    polyethylene glycol (PEG 3350) (Miralax) 17 g oral packet 17 g, 17 g, Oral, Daily PRN    sennosides (Senokot) tab 17.2 mg, 17.2 mg, Oral, Nightly PRN    bisacodyl (Dulcolax) 10 MG rectal suppository 10 mg, 10 mg, Rectal, Daily PRN    fleet enema (Fleet) 7-19 GM/118ML rectal enema 133 mL, 1 enema, Rectal, Once PRN    ondansetron (Zofran) 4 MG/2ML injection 4 mg, 4 mg, Intravenous, Q6H PRN    prochlorperazine (Compazine) 10 MG/2ML injection 5 mg, 5 mg, Intravenous, Q8H PRN    albuterol (Ventolin HFA) 108 (90 Base) MCG/ACT inhaler 2 puff, 2 puff, Inhalation, Q6H PRN    sertraline (Zoloft) tab 25 mg, 25 mg, Oral, Nightly    losartan (Cozaar) tab 100 mg, 100 mg, Oral, Daily    atorvastatin (Lipitor) tab 40 mg, 40 mg, Oral, Nightly    Review of Systems:  A comprehensive review of systems was negative if not otherwise mention in above HPI.    /59 (BP Location: Right arm)   Pulse 58   Temp 98.3 °F (36.8 °C) (Oral)   Resp 16   Wt 179 lb (81.2 kg)   SpO2 93%   BMI 33.82 kg/m²   Temp (24hrs), Av.3 °F (36.8 °C), Min:97.8 °F (36.6 °C), Max:99 °F (37.2 °C)       Intake/Output Summary (Last 24 hours) at 2024 0839  Last data filed at 2024 2347  Gross per 24 hour   Intake 290 ml   Output --   Net 290 ml     Wt Readings from Last 3 Encounters:   24 179 lb (81.2 kg)   24 178 lb (80.7 kg)   22 175 lb (79.4 kg)       Physical Exam:   General: Alert and oriented x 3. No apparent distress. No respiratory or constitutional distress.  HEENT: Normocephalic, anicteric sclera, neck supple.  Neck: No JVD  Cardiac: Regular rate and rhythm. S1, S2 normal. No murmur, pericardial rub, S3.  Lungs: Clear without wheezes, rales, rhonchi or dullness.  Normal  excursions and effort.  Abdomen: Soft, non-tender. BS-present.  Extremities: Without clubbing, cyanosis or edema.    Neurologic: Alert and oriented, normal affect.  Skin: Warm and dry.     Laboratory Data:  Lab Results   Component Value Date    WBC 10.2 06/27/2024    HGB 12.7 06/27/2024    HCT 39.0 06/27/2024    .0 06/27/2024    CREATSERUM 0.68 06/27/2024    BUN 14 06/27/2024     06/27/2024    K 3.8 06/27/2024     06/27/2024    CO2 25.0 06/27/2024     06/27/2024    CA 8.9 06/27/2024    ALB 3.5 06/27/2024    ALKPHO 94 06/27/2024    BILT 1.2 06/27/2024    TP 6.9 06/27/2024    AST 18 06/27/2024    ALT 24 06/27/2024    TSH 1.360 06/26/2024    MG 2.3 06/27/2024    PHOS 4.1 06/27/2024       Imaging/results:  EKG- sinus bradycardia  CXR - No acute cardiopulm disease   High sensitivity troponin - 3, 4      Assessment:  Chest pain, suspect less likely cardiac   HTN  HLD  Hyperglycemia   Obesity       Plan:  ECHO   Exercise stress echo given risk factors   If above are WNL, okay to discharge from cardiology perspective         Thank you for allowing me to participate in the care of your patient.      Dominic Lynn DO  Cardiologist  [facility] [place]  6/27/2024 8:39 AM      Note to the patient: The 21st Century Cures Act makes medical notes like these available to patients in the interest of transparency. However, be advised that this is a medical document. It is intended as peer to peer communication. It is written in medical language and may contain abbreviations or verbiage that are unfamiliar. It may appear blunt or direct. Medical documents are intended to carry relevant information, facts as evident, and clinical opinion of the practitioner.     Disclaimer: Components of this note were documented using voice recognition system and are subject to errors not corrected at proofreading. Contact the author of this note for any clarifications.

## 2024-06-27 NOTE — DISCHARGE SUMMARY
[facility]  DISCHARGE SUMMARY     Melody Padron Patient Status:  Observation    1965 MRN [id number]   Location [facility] 0SW-A Attending Anni Cat MD   Hosp Day # 0 PCP No primary care provider on file.     Date of Admission: 2024  Date of Discharge:   24    Discharge Disposition: Home or Self Care    Discharge Diagnosis:  Chest pain  Sinu bradycardia   Hypertension  HLD  GERD    History of Present Illness:     Melody Padron is a 58 year old female with history of HTN, HLD, GERD, prediabetes, anxiety presented with chest pain and left arm pain.      Patient reports episode of chest pain and left arm heaviness 3 weeks ago. Today she was resting at home when she developed similar symptoms. She was concerned it is a heart attack to presented to the ED. Denies SOB, recent travel, fevers, chills.      ED VSS on RA.  EKG with sinus bradycardia and no ST-T wave changes concerning for ACS.  Chest x-ray was negative for acute findings.  High sensitive troponin 3/4.  Blood work otherwise within normal limits.  Patient was given full dose aspirin and admitted.  Cardiology was also consulted.     Brief Synopsis:   Pt admitted, cards consulted. Stress test completed. See below  DC home.     Lace+ Score: 28  59-90 High Risk  29-58 Medium Risk  0-28   Low Risk  Patient was referred to the [facility].    TCM Follow-Up Recommendation:  LACE > 58: High Risk of readmission after discharge from the hospital.      Procedures during hospitalization:   Stress test     Incidental or significant findings and recommendations (brief descriptions):  no    Lab/Test results pending at Discharge:   n    Consultants:  Cards     Discharge Medication List:     Discharge Medications        CONTINUE taking these medications        Instructions Prescription details   albuterol 108 (90 Base) MCG/ACT Aers  Commonly known as: Ventolin HFA      Inhale 2 puffs into the lungs every 6 (six) hours  as needed for Wheezing or Shortness of Breath.   Refills: 0     atorvastatin 20 MG Tabs  Commonly known as: Lipitor      Take 2 tablets (40 mg total) by mouth at bedtime.   Refills: 0     ibuprofen 600 MG Tabs  Commonly known as: Motrin      Take 1 tablet (600 mg total) by mouth every 8 (eight) hours as needed for Pain or Fever.   Quantity: 15 tablet  Refills: 0     sertraline 50 MG Tabs  Commonly known as: Zoloft      Take 0.5 tablets (25 mg total) by mouth at bedtime.   Refills: 0     telmisartan 80 MG Tabs  Commonly known as: Micardis      Take 1 tablet (80 mg total) by mouth daily.   Refills: 0            ASK your doctor about these medications        Instructions Prescription details   acetaminophen 500 MG Tabs  Commonly known as: Tylenol Extra Strength      Take 1,000 mg by mouth every 6 (six) hours as needed for Pain.   Refills: 0              [place]     Follow-up appointment:   No follow-up provider specified.  Appointments for Next 30 Days 2024 - 2024      None            Vital signs:  Temp:  [97.4 °F (36.3 °C)-99 °F (37.2 °C)] 97.4 °F (36.3 °C)  Pulse:  [54-80] 70  Resp:  [16] 16  BP: (105-135)/(59-74) 105/64  SpO2:  [93 %-99 %] 97 %    Physical Exam:    General: No acute distress   Lungs: clear to auscultation  Cardiovascular: S1, S2  Abdomen: Soft      -----------------------------------------------------------------------------------------------  PATIENT DISCHARGE INSTRUCTIONS: See electronic chart    Anni Cat MD    Total time spent on discharge plannin minutes     The  Cures Act makes medical notes like these available to patients in the interest of transparency. Please be advised this is a medical document. Medical documents are intended to carry relevant information, facts as evident, and the clinical opinion of the practitioner. The medical note is intended as peer to peer communication and may appear blunt or direct. It is written in medical language  and may contain abbreviations or verbiage that are unfamiliar.

## 2024-06-27 NOTE — H&P
[facility]     Melody Padron Patient Status:  Observation    1965 MRN [id number]   Location [facility] 0SW-A Attending Bi Gamez MD   Hosp Day # 0 PCP No primary care provider on file.     Chief Complaint: chest pressure, left arm pain    Subjective:    History of Present Illness:     Melody Padron is a 58 year old female with history of HTN, HLD, GERD, prediabetes, anxiety presented with chest pain and left arm pain.     Patient reports episode of chest pain and left arm heaviness 3 weeks ago. Today she was resting at home when she developed similar symptoms. She was concerned it is a heart attack to presented to the ED. Denies SOB, recent travel, fevers, chills.     ED VSS on RA.  EKG with sinus bradycardia and no ST-T wave changes concerning for ACS.  Chest x-ray was negative for acute findings.  High sensitive troponin 3/4.  Blood work otherwise within normal limits.  Patient was given full dose aspirin and admitted.  Cardiology was also consulted.    History/Other:    Past Medical History:  Past Medical History:    Anxiety state    Borderline diabetes    Calculus of kidney    Esophageal reflux    Essential hypertension    GERD    intermittent    High blood pressure    High cholesterol    HYPERLIPIDEMIA    Hyperlipidemia    Kidney stone    PONV (postoperative nausea and vomiting)    Prediabetes    diet control no medications     Past Surgical History:   Past Surgical History:   Procedure Laterality Date    Cholecystectomy      Hysterectomy      Laparoscopy,diagnostic      left overy removed    Lumpectomy right      x 2    Other      bladder lift    Total abdom hysterectomy        Family History:   Family History   Problem Relation Age of Onset    Hypertension Father     Cancer Father         stomach    Cancer Mother         breast CA    Diabetes Brother      Social History:    reports that she has never smoked. She has never used smokeless tobacco. She  reports that she does not drink alcohol and does not use drugs.     Allergies: No Known Allergies    Medications:    No current facility-administered medications on file prior to encounter.     Current Outpatient Medications on File Prior to Encounter   Medication Sig Dispense Refill    sertraline 50 MG Oral Tab Take 1 tablet (50 mg total) by mouth daily.      Telmisartan 80 MG Oral Tab Take 1 tablet (80 mg total) by mouth daily.      atorvastatin 20 MG Oral Tab Take 1 tablet (20 mg total) by mouth daily.      ibuprofen 600 MG Oral Tab Take 1 tablet (600 mg total) by mouth every 8 (eight) hours as needed for Pain or Fever. 15 tablet 0    albuterol sulfate (2.5 MG/3ML) 0.083% Inhalation Nebu Soln Take 2.5 mg by nebulization every 6 (six) hours as needed. (Patient not taking: Reported on 8/16/2021 )      benzonatate 100 MG Oral Cap Take 100 mg by mouth 3 (three) times daily as needed. (Patient not taking: Reported on 8/16/2021 )      PARoxetine HCl 20 MG Oral Tab Take 0.5 tablets (10 mg total) by mouth every morning.      zinc sulfate 220 (50 Zn) MG Oral Cap Take 220 mg by mouth 2 (two) times daily. (Patient not taking: Reported on 1/26/2024)      acetaminophen 500 MG Oral Tab Take 1,000 mg by mouth every 6 (six) hours as needed for Pain. (Patient not taking: Reported on 8/16/2021 )         Review of Systems:   A comprehensive review of systems was completed.    Pertinent positives and negatives noted in the HPI.    Objective:   Physical Exam:    /71 (BP Location: Right arm)   Pulse 58   Temp 98.4 °F (36.9 °C) (Oral)   Resp 16   Wt 179 lb (81.2 kg)   SpO2 99%   BMI 33.82 kg/m²   General: No acute distress, Alert  Respiratory: No rhonchi, no wheezes  Cardiovascular: S1, S2. Regular rate and rhythm  Abdomen: Soft, Non-tender, non-distended, positive bowel sounds  Neuro: No new focal deficits  Extremities: No edema    Results:    Labs:      Labs Last 24 Hours:    Recent Labs   Lab 06/26/24  1453   RBC 4.58    HGB 13.3   HCT 39.1   MCV 85.4   MCH 29.0   MCHC 34.0   RDW 13.5   NEPRELIM 6.27   WBC 10.3   .0       Recent Labs   Lab 06/26/24  1453   *   BUN 11   CREATSERUM 0.75   EGFRCR 93   CA 9.5   ALB 3.9      K 3.9      CO2 25.0   ALKPHO 103   AST 16   ALT 29   BILT 0.9   TP 7.7       Lab Results   Component Value Date    INR 0.96 11/21/2020       Recent Labs   Lab 06/26/24  1453 06/26/24  1652   TROPHS 3 4       No results for input(s): \"TROP\", \"PBNP\" in the last 168 hours.    No results for input(s): \"PCT\" in the last 168 hours.    Imaging: Imaging data reviewed in Epic.    Assessment & Plan:      #Chest pain  #Sinus bradycardia  High sensory troponin x 2 negative.  EKG on admission nonischemic.  -TTE, TSH  -Telemetry  -Cardiology consulted in the ER    #Hypertension - ARB  #Hyperlipidemia-statin  #GERD  #Prediabetes  #Anxiety-sertraline  #Obesity, BMI 33.82      Plan of care discussed with patient    Harriet Bass MD    Supplementary Documentation:     The 21st Century Cures Act makes medical notes like these available to patients in the interest of transparency. Please be advised this is a medical document. Medical documents are intended to carry relevant information, facts as evident, and the clinical opinion of the practitioner. The medical note is intended as peer to peer communication and may appear blunt or direct. It is written in medical language and may contain abbreviations or verbiage that are unfamiliar.

## 2024-06-27 NOTE — PLAN OF CARE
NURSING ADMISSION NOTE      Patient admitted via Ambulance  Oriented to room.  Safety precautions initiated.  Bed in low position.  Call light in reach.    Received patient from [place] [facility] via [facility] to room #0010.    Problem: CARDIOVASCULAR - ADULT  Goal: Maintains optimal cardiac output and hemodynamic stability  Description: INTERVENTIONS:  - Monitor vital signs, rhythm, and trends  - Monitor for bleeding, hypotension and signs of decreased cardiac output  - Evaluate effectiveness of vasoactive medications to optimize hemodynamic stability  - Assess quality of pulses, skin color and temperature  - Assess for signs of decreased coronary artery perfusion - ex. Angina  - Evaluate fluid balance, assess for edema, trend weights  6/26/2024 1926 by Graciela Morrison RN  Outcome: Progressing  6/26/2024 1925 by Graciela Morrison RN  Outcome: Progressing  Goal: Absence of cardiac arrhythmias or at baseline  Description: INTERVENTIONS:  - Continuous cardiac monitoring, monitor vital signs, obtain 12 lead EKG if indicated  - Evaluate effectiveness of antiarrhythmic and heart rate control medications as ordered  - Initiate emergency measures for life threatening arrhythmias  - Monitor electrolytes and administer replacement therapy as ordered  6/26/2024 1926 by Graciela oMrrison RN  Outcome: Progressing  6/26/2024 1925 by Graciela Morrison RN  Outcome: Progressing     Problem: METABOLIC/FLUID AND ELECTROLYTES - ADULT  Goal: Electrolytes maintained within normal limits  Description: INTERVENTIONS:  - Monitor labs and rhythm and assess patient for signs and symptoms of electrolyte imbalances  - Administer electrolyte replacement as ordered  - Monitor response to electrolyte replacements, including rhythm and repeat lab results as appropriate  6/26/2024 1926 by Graciela Morrison RN  Outcome: Progressing  6/26/2024 1925 by Graciela Morrison RN  Outcome: Progressing     Problem: PAIN - ADULT  Goal: Verbalizes/displays  adequate comfort level or patient's stated pain goal  Description: INTERVENTIONS:  - Encourage pt to monitor pain and request assistance  - Assess pain using appropriate pain scale  - Administer analgesics based on type and severity of pain and evaluate response  - Implement non-pharmacological measures as appropriate and evaluate response  - Consider cultural and social influences on pain and pain management  - Manage/alleviate anxiety  - Utilize distraction and/or relaxation techniques  - Monitor for opioid side effects  - Notify MD/LIP if interventions unsuccessful or patient reports new pain  - Anticipate increased pain with activity and pre-medicate as appropriate  Outcome: Progressing

## 2024-06-27 NOTE — PLAN OF CARE
Patient alert and  oriented x 4  Denies chest pain and shortness of breath. No signs of distress  She is sinus rhythm on tele . She gets bradycardic - heart rate high 50's with sleep.   Due meds given.  Follow up labs and echo  ordered for tomorrow  Falls and safety precautions maintained  Problem: PAIN - ADULT  Goal: Verbalizes/displays adequate comfort level or patient's stated pain goal  Description: INTERVENTIONS:  - Encourage pt to monitor pain and request assistance  - Assess pain using appropriate pain scale  - Administer analgesics based on type and severity of pain and evaluate response  - Implement non-pharmacological measures as appropriate and evaluate response  - Consider cultural and social influences on pain and pain management  - Manage/alleviate anxiety  - Utilize distraction and/or relaxation techniques  - Monitor for opioid side effects  - Notify MD/LIP if interventions unsuccessful or patient reports new pain  - Anticipate increased pain with activity and pre-medicate as appropriate  Outcome: Progressing     Problem: METABOLIC/FLUID AND ELECTROLYTES - ADULT  Goal: Electrolytes maintained within normal limits  Description: INTERVENTIONS:  - Monitor labs and rhythm and assess patient for signs and symptoms of electrolyte imbalances  - Administer electrolyte replacement as ordered  - Monitor response to electrolyte replacements, including rhythm and repeat lab results as appropriate  Outcome: Progressing     Problem: CARDIOVASCULAR - ADULT  Goal: Absence of cardiac arrhythmias or at baseline  Description: INTERVENTIONS:  - Continuous cardiac monitoring, monitor vital signs, obtain 12 lead EKG if indicated  - Evaluate effectiveness of antiarrhythmic and heart rate control medications as ordered  - Initiate emergency measures for life threatening arrhythmias  - Monitor electrolytes and administer replacement therapy as ordered  Outcome: Progressing     Problem: Patient/Family Goals  Goal:  Patient/Family Long Term Goal  Description: Problem: Patient/Family Goals    Goal: Patient/Family Long Term Goal    Description: Description: Patient's Long Term Goal: \" stay healthy at home\"    Interventions:  -cardiology consult  -follow up labs  -echo  -take medications as prescribed  -attend follow up appointments as recommended  -diet as advised  -report new or worsening symptoms to physician    Outcome: Progressing    Goal: Patient/Family Short Term Goal  Description: Patient's Short Term Goal:     6/26 NOC \" no more chest pain\"    Interventions:   -cardiology consult  -monitor tele  -echo  Outcome: Progressing

## 2024-06-27 NOTE — PROGRESS NOTES
Stress echo completed as ordered.  Patient walked 7 min modified tita stopping d/t fatigue. Max bp 168/80; APMHR 95%; denied cardiac symptoms.  Echo images pending.

## 2024-06-28 ENCOUNTER — PATIENT OUTREACH (OUTPATIENT)
Dept: CASE MANAGEMENT | Age: 59
End: 2024-06-28

## 2024-07-09 NOTE — PAYOR COMM NOTE
--------------  DISCHARGE REVIEW    Payor: [address]  Subscriber #:  [id number]  Authorization Number: [id number]      Admit date: 11/12/20  Admit time:  9010  Discharge Date: 11/19/2020  6:38 PM     Admitting Physician: Polina Brown MD  At No. · none    Consultants:  • ID, Pulm    Discharge Medication List:     Discharge Medications      CONTINUE taking these medications      Instructions Prescription details   acetaminophen 500 MG Tabs  Commonly known as: TYLENOL EXTRA STRENGTH      Take 1,00

## 2024-12-02 ENCOUNTER — TELEPHONE (OUTPATIENT)
Dept: INTERNAL MEDICINE | Age: 59
End: 2024-12-02

## 2024-12-30 ENCOUNTER — CLINICAL ABSTRACT (OUTPATIENT)
Dept: INTERNAL MEDICINE | Age: 59
End: 2024-12-30

## 2024-12-30 VITALS — DIASTOLIC BLOOD PRESSURE: 83 MMHG | SYSTOLIC BLOOD PRESSURE: 133 MMHG

## 2025-05-30 ENCOUNTER — HOSPITAL ENCOUNTER (EMERGENCY)
Age: 60
Discharge: HOME | End: 2025-05-30
Attending: EMERGENCY MEDICINE
Payer: MEDICAID

## 2025-05-30 ENCOUNTER — APPOINTMENT (OUTPATIENT)
Dept: GENERAL RADIOLOGY | Age: 60
End: 2025-05-30
Attending: EMERGENCY MEDICINE
Payer: MEDICAID

## 2025-05-30 VITALS
SYSTOLIC BLOOD PRESSURE: 132 MMHG | RESPIRATION RATE: 16 BRPM | BODY MASS INDEX: 34.36 KG/M2 | OXYGEN SATURATION: 99 % | HEART RATE: 78 BPM | WEIGHT: 182 LBS | HEIGHT: 61 IN | TEMPERATURE: 99 F | DIASTOLIC BLOOD PRESSURE: 75 MMHG

## 2025-05-30 DIAGNOSIS — R07.89 CHEST PAIN, ATYPICAL: Primary | ICD-10-CM

## 2025-05-30 LAB
ALBUMIN SERPL-MCNC: 4.8 G/DL (ref 3.2–4.8)
ALBUMIN/GLOB SERPL: 1.7 {RATIO} (ref 1–2)
ALP LIVER SERPL-CCNC: 91 U/L (ref 46–118)
ALT SERPL-CCNC: 24 U/L (ref 10–49)
ANION GAP SERPL CALC-SCNC: 7 MMOL/L (ref 0–18)
AST SERPL-CCNC: 19 U/L (ref ?–34)
BASOPHILS # BLD AUTO: 0.03 X10(3) UL (ref 0–0.2)
BASOPHILS NFR BLD AUTO: 0.2 %
BILIRUB SERPL-MCNC: 0.7 MG/DL (ref 0.3–1.2)
BUN BLD-MCNC: 16 MG/DL (ref 9–23)
CALCIUM BLD-MCNC: 9.5 MG/DL (ref 8.7–10.6)
CHLORIDE SERPL-SCNC: 108 MMOL/L (ref 98–112)
CO2 SERPL-SCNC: 24 MMOL/L (ref 21–32)
CREAT BLD-MCNC: 0.74 MG/DL (ref 0.55–1.02)
EGFRCR SERPLBLD CKD-EPI 2021: 94 ML/MIN/1.73M2 (ref 60–?)
EOSINOPHIL # BLD AUTO: 0.13 X10(3) UL (ref 0–0.7)
EOSINOPHIL NFR BLD AUTO: 1.1 %
ERYTHROCYTE [DISTWIDTH] IN BLOOD BY AUTOMATED COUNT: 13.4 %
GLOBULIN PLAS-MCNC: 2.8 G/DL (ref 2–3.5)
GLUCOSE BLD-MCNC: 130 MG/DL (ref 70–99)
HCT VFR BLD AUTO: 40.1 % (ref 35–48)
HGB BLD-MCNC: 13.2 G/DL (ref 12–16)
IMM GRANULOCYTES # BLD AUTO: 0.03 X10(3) UL (ref 0–1)
IMM GRANULOCYTES NFR BLD: 0.2 %
LYMPHOCYTES # BLD AUTO: 3.32 X10(3) UL (ref 1–4)
LYMPHOCYTES NFR BLD AUTO: 27.5 %
MCH RBC QN AUTO: 28.7 PG (ref 26–34)
MCHC RBC AUTO-ENTMCNC: 32.9 G/DL (ref 31–37)
MCV RBC AUTO: 87.2 FL (ref 80–100)
MONOCYTES # BLD AUTO: 0.73 X10(3) UL (ref 0.1–1)
MONOCYTES NFR BLD AUTO: 6.1 %
NEUTROPHILS # BLD AUTO: 7.82 X10 (3) UL (ref 1.5–7.7)
NEUTROPHILS # BLD AUTO: 7.82 X10(3) UL (ref 1.5–7.7)
NEUTROPHILS NFR BLD AUTO: 64.9 %
OSMOLALITY SERPL CALC.SUM OF ELEC: 291 MOSM/KG (ref 275–295)
PLATELET # BLD AUTO: 311 10(3)UL (ref 150–450)
POCT INFLUENZA A: NEGATIVE
POCT INFLUENZA B: NEGATIVE
POTASSIUM SERPL-SCNC: 3.8 MMOL/L (ref 3.5–5.1)
PROT SERPL-MCNC: 7.6 G/DL (ref 5.7–8.2)
RBC # BLD AUTO: 4.6 X10(6)UL (ref 3.8–5.3)
SARS-COV-2 RNA RESP QL NAA+PROBE: NOT DETECTED
SODIUM SERPL-SCNC: 139 MMOL/L (ref 136–145)
TROPONIN I SERPL HS-MCNC: <3 NG/L (ref ?–34)
WBC # BLD AUTO: 12.1 X10(3) UL (ref 4–11)

## 2025-05-30 PROCEDURE — 80053 COMPREHEN METABOLIC PANEL: CPT | Performed by: EMERGENCY MEDICINE

## 2025-05-30 PROCEDURE — 99284 EMERGENCY DEPT VISIT MOD MDM: CPT

## 2025-05-30 PROCEDURE — 36415 COLL VENOUS BLD VENIPUNCTURE: CPT

## 2025-05-30 PROCEDURE — 71045 X-RAY EXAM CHEST 1 VIEW: CPT | Performed by: EMERGENCY MEDICINE

## 2025-05-30 PROCEDURE — 87502 INFLUENZA DNA AMP PROBE: CPT | Performed by: EMERGENCY MEDICINE

## 2025-05-30 PROCEDURE — 84484 ASSAY OF TROPONIN QUANT: CPT | Performed by: EMERGENCY MEDICINE

## 2025-05-30 PROCEDURE — 93010 ELECTROCARDIOGRAM REPORT: CPT

## 2025-05-30 PROCEDURE — 93005 ELECTROCARDIOGRAM TRACING: CPT

## 2025-05-30 PROCEDURE — 99285 EMERGENCY DEPT VISIT HI MDM: CPT

## 2025-05-30 PROCEDURE — 85025 COMPLETE CBC W/AUTO DIFF WBC: CPT | Performed by: EMERGENCY MEDICINE

## 2025-05-30 RX ORDER — NAPROXEN 500 MG/1
500 TABLET ORAL 2 TIMES DAILY PRN
Qty: 20 TABLET | Refills: 0 | Status: SHIPPED | OUTPATIENT
Start: 2025-05-30 | End: 2025-06-06

## 2025-05-30 NOTE — ED PROVIDER NOTES
Patient Seen in: [facility] In [place]        History  Chief Complaint   Patient presents with    Chest Pain Angina     Stated Complaint: chest pain since monday with cold symptoms.    Subjective:   HPI            59-year-old female comes to the hospital chest pain.  Is been present over the last 3 weeks.'s been intermittent and can happen 2-3 times a day at random that only lasts a few moments.  There is nothing specific that make it better or worse.  She denies any associated features including radiation, nausea, shortness of breath, diaphoresis or other factors.  She states been having upper respiratory cold symptoms over the last 5 days as well.  She denies any fevers.  She has no significant phlegm production.  She not short of breath.  She is denying any other complaints at this time.      Objective:     Past Medical History:    Anxiety state    Borderline diabetes    Calculus of kidney    Esophageal reflux    Essential hypertension    GERD    intermittent    High blood pressure    High cholesterol    HYPERLIPIDEMIA    Hyperlipidemia    Kidney stone    PONV (postoperative nausea and vomiting)    Prediabetes    diet control no medications              Past Surgical History:   Procedure Laterality Date    Cholecystectomy      Hysterectomy      Laparoscopy,diagnostic      left overy removed    Lumpectomy right      x 2    Other      bladder lift    Total abdom hysterectomy                  Social History     Socioeconomic History    Marital status:    Tobacco Use    Smoking status: Never    Smokeless tobacco: Never   Vaping Use    Vaping status: Never Used   Substance and Sexual Activity    Alcohol use: No    Drug use: No     Social Drivers of Health     Food Insecurity: No Food Insecurity (6/26/2024)    Food Insecurity     Food Insecurity: Never true   Transportation Needs: No Transportation Needs (6/26/2024)    Transportation Needs     Lack of Transportation: No   Housing Stability:  Low Risk  (6/26/2024)    Housing Stability     Housing Instability: No                                Physical Exam    ED Triage Vitals [05/30/25 1221]   /78   Pulse 65   Resp 18   Temp 98.9 °F (37.2 °C)   Temp src Oral   SpO2 98 %   O2 Device None (Room air)       Current Vitals:   Vital Signs  BP: 137/78  Pulse: 65  Resp: 18  Temp: 98.9 °F (37.2 °C)  Temp src: Oral    Oxygen Therapy  SpO2: 98 %  O2 Device: None (Room air)            Physical Exam  HEENT; NCAT, EOMI, throat clear, neck supple, no LAD, no JVD  Heart S1S2 RRR, chest wall is tender  lungs: CTAB  abd: Soft NT, ND,  NABS without rebound or guarding  Ext no C/C/E        ED Course  Labs Reviewed   COMP METABOLIC PANEL (14) - Abnormal; Notable for the following components:       Result Value    Glucose 130 (*)     All other components within normal limits   CBC WITH DIFFERENTIAL WITH PLATELET - Abnormal; Notable for the following components:    WBC 12.1 (*)     Neutrophil Absolute Prelim 7.82 (*)     Neutrophil Absolute 7.82 (*)     All other components within normal limits   TROPONIN I HIGH SENSITIVITY - Normal   RAPID SARS-COV-2 BY PCR - Normal   POCT FLU TEST - Normal    Narrative:     This assay is a rapid molecular in vitro test utilizing nucleic acid amplification of influenza A and B viral RNA.   RAINBOW DRAW BLUE     EKG    Rate, intervals and axes as noted on EKG Report.  Rate: 63  Rhythm: Sinus Rhythm  Reading: , QRS of 94, patient is normal sinus and without ischemic change.           ED Course as of 05/30/25 1342  ------------------------------------------------------------  Time: 05/30 1341  Comment: Well.  The patient's CMP, CBC are unremarkable.  The patient's troponin and influenza are negative.  The chest x-ray I interpreted shows no acute cardiopulmonary process.  I reviewed the radiology report as well.     XR CHEST AP PORTABLE  (CPT=71045)  Result Date: 5/30/2025  PROCEDURE:  XR CHEST AP PORTABLE  (CPT=71045)  TECHNIQUE:  AP  chest radiograph was obtained.  COMPARISON:  [facility], XR, XR CHEST AP PORTABLE  (CPT=71045), 6/26/2024, 3:29 PM.  INDICATIONS:  chest pain since monday with cold symptoms.  PATIENT STATED HISTORY: (As transcribed by Technologist)  Pt c/o intermittent chest pressure/pain and throat pain for one week.    FINDINGS:  The heart is normal in size.  The lungs are clear of acute-appearing disease process.  The costophrenic angles are sharp.  There is no active disease seen on the basis of portable chest radiography.            CONCLUSION:  No active disease seen.   LOCATION:  [id number]      Dictated by (CST): Ryan Peña MD on 5/30/2025 at 1:20 PM     Finalized by (CST): Ryan Peña MD on 5/30/2025 at 1:21 PM       Medications - No data to display                    MDM     Differential diagnosis included pneumonia, pneumothorax, cardiac disease but not limited such.  Patient's workup is consistent with chest wall origination at this time the patient was discharged home with outpatient management follow-up.    Patient was screened and evaluated during this visit.   As a treating physician attending to the patient, I determined, within reasonable clinical confidence and prior to discharge, that an emergency medical condition was not or was no longer present.  There was no indication for further evaluation, treatment or admission on an emergency basis.       The usual and customary discharge instuctions were discussed given the patient's ER course.  We discussed signs and symptoms that should prompt the patient's immediate return to the emergency department.   Reasonable over the counter and prescription treatment options and Physician follow up plan was discussed.       The patient is discharged in good condition.     This note was prepared using Dragon Medical voice recognition dictation software.  As a result errors may occur.  When identified to these areas have been corrected.  While every attempt is made to  correct errors during dictation discrepancies may still exist.  Please contact if there are any errors.    Medical Decision Making      Disposition and Plan     Clinical Impression:  1. Chest pain, atypical         Disposition:  Discharge  5/30/2025  1:42 pm    Follow-up:  Leonel Bowen MD  [address] [address] [address]  [phone]    Schedule an appointment as soon as possible for a visit in 2 day(s)            Medications Prescribed:  Current Discharge Medication List        START taking these medications    Details   naproxen 500 MG Oral Tab Take 1 tablet (500 mg total) by mouth 2 (two) times daily as needed.  Qty: 20 tablet, Refills: 0                   Supplementary Documentation:

## 2025-05-30 NOTE — ED INITIAL ASSESSMENT (HPI)
Pt reports since Monday she has had intermittent chest discomfort. Reports the pain is a pressure feeling. Denies that the pain radiates anywhere. Also reports flu like symptoms since Monday; denies a cough.

## 2025-05-31 LAB
ATRIAL RATE: 63 BPM
P AXIS: 60 DEGREES
P-R INTERVAL: 140 MS
Q-T INTERVAL: 434 MS
QRS DURATION: 94 MS
QTC CALCULATION (BEZET): 444 MS
R AXIS: 37 DEGREES
T AXIS: 33 DEGREES
VENTRICULAR RATE: 63 BPM

## 2025-08-19 ENCOUNTER — APPOINTMENT (OUTPATIENT)
Dept: CT IMAGING | Age: 60
End: 2025-08-19
Attending: EMERGENCY MEDICINE

## 2025-08-19 ENCOUNTER — HOSPITAL ENCOUNTER (EMERGENCY)
Age: 60
Discharge: HOME | End: 2025-08-19
Attending: EMERGENCY MEDICINE

## 2025-08-19 VITALS
OXYGEN SATURATION: 100 % | WEIGHT: 184 LBS | BODY MASS INDEX: 35 KG/M2 | HEART RATE: 60 BPM | SYSTOLIC BLOOD PRESSURE: 150 MMHG | DIASTOLIC BLOOD PRESSURE: 70 MMHG | RESPIRATION RATE: 16 BRPM | TEMPERATURE: 98 F

## 2025-08-19 DIAGNOSIS — F41.9 ANXIETY: ICD-10-CM

## 2025-08-19 DIAGNOSIS — K62.5 BRBPR (BRIGHT RED BLOOD PER RECTUM): ICD-10-CM

## 2025-08-19 DIAGNOSIS — K52.9 COLITIS: Primary | ICD-10-CM

## 2025-08-19 LAB
ALBUMIN SERPL-MCNC: 4.3 G/DL (ref 3.2–4.8)
ALBUMIN/GLOB SERPL: 1.5 (ref 1–2)
ALP LIVER SERPL-CCNC: 84 U/L (ref 46–118)
ALT SERPL-CCNC: 19 U/L (ref 10–49)
ANION GAP SERPL CALC-SCNC: 9 MMOL/L (ref 0–18)
AST SERPL-CCNC: 14 U/L (ref ?–34)
BASOPHILS # BLD AUTO: 0.04 X10(3) UL (ref 0–0.2)
BASOPHILS NFR BLD AUTO: 0.4 %
BILIRUB SERPL-MCNC: 0.6 MG/DL (ref 0.3–1.2)
BILIRUB UR QL STRIP.AUTO: NEGATIVE
BUN BLD-MCNC: 14 MG/DL (ref 9–23)
CALCIUM BLD-MCNC: 8.9 MG/DL (ref 8.7–10.6)
CHLORIDE SERPL-SCNC: 105 MMOL/L (ref 98–112)
CLARITY UR REFRACT.AUTO: CLEAR
CO2 SERPL-SCNC: 25 MMOL/L (ref 21–32)
COLOR UR AUTO: YELLOW
CREAT BLD-MCNC: 0.83 MG/DL (ref 0.55–1.02)
EGFRCR SERPLBLD CKD-EPI 2021: 82 ML/MIN/1.73M2 (ref 60–?)
EOSINOPHIL # BLD AUTO: 0.12 X10(3) UL (ref 0–0.7)
EOSINOPHIL NFR BLD AUTO: 1.2 %
ERYTHROCYTE [DISTWIDTH] IN BLOOD BY AUTOMATED COUNT: 13.7 %
GLOBULIN PLAS-MCNC: 2.8 G/DL (ref 2–3.5)
GLUCOSE BLD-MCNC: 129 MG/DL (ref 70–99)
GLUCOSE UR STRIP.AUTO-MCNC: NEGATIVE MG/DL
HCT VFR BLD AUTO: 39.9 % (ref 35–48)
HGB BLD-MCNC: 13.3 G/DL (ref 12–16)
IMM GRANULOCYTES # BLD AUTO: 0.03 X10(3) UL (ref 0–1)
IMM GRANULOCYTES NFR BLD: 0.3 %
KETONES UR STRIP.AUTO-MCNC: NEGATIVE MG/DL
LEUKOCYTE ESTERASE UR QL STRIP.AUTO: NEGATIVE
LIPASE SERPL-CCNC: 50 U/L (ref 12–53)
LYMPHOCYTES # BLD AUTO: 2.52 X10(3) UL (ref 1–4)
LYMPHOCYTES NFR BLD AUTO: 25.6 %
MCH RBC QN AUTO: 28.8 PG (ref 26–34)
MCHC RBC AUTO-ENTMCNC: 33.3 G/DL (ref 31–37)
MCV RBC AUTO: 86.4 FL (ref 80–100)
MONOCYTES # BLD AUTO: 0.48 X10(3) UL (ref 0.1–1)
MONOCYTES NFR BLD AUTO: 4.9 %
NEUTROPHILS # BLD AUTO: 6.66 X10 (3) UL (ref 1.5–7.7)
NEUTROPHILS # BLD AUTO: 6.66 X10(3) UL (ref 1.5–7.7)
NEUTROPHILS NFR BLD AUTO: 67.6 %
NITRITE UR QL STRIP.AUTO: NEGATIVE
OSMOLALITY SERPL CALC.SUM OF ELEC: 290 MOSM/KG (ref 275–295)
PH UR STRIP.AUTO: 6 (ref 5–8)
PLATELET # BLD AUTO: 291 10(3)UL (ref 150–450)
POTASSIUM SERPL-SCNC: 3.9 MMOL/L (ref 3.5–5.1)
PROT SERPL-MCNC: 7.1 G/DL (ref 5.7–8.2)
PROT UR STRIP.AUTO-MCNC: NEGATIVE MG/DL
RBC # BLD AUTO: 4.62 X10(6)UL (ref 3.8–5.3)
SODIUM SERPL-SCNC: 139 MMOL/L (ref 136–145)
SP GR UR STRIP.AUTO: 1.02 (ref 1–1.03)
UROBILINOGEN UR STRIP.AUTO-MCNC: 0.2 MG/DL
WBC # BLD AUTO: 9.9 X10(3) UL (ref 4–11)

## 2025-08-19 PROCEDURE — 99285 EMERGENCY DEPT VISIT HI MDM: CPT

## 2025-08-19 PROCEDURE — 81001 URINALYSIS AUTO W/SCOPE: CPT | Performed by: EMERGENCY MEDICINE

## 2025-08-19 PROCEDURE — 96372 THER/PROPH/DIAG INJ SC/IM: CPT

## 2025-08-19 PROCEDURE — 81015 MICROSCOPIC EXAM OF URINE: CPT | Performed by: EMERGENCY MEDICINE

## 2025-08-19 PROCEDURE — 83690 ASSAY OF LIPASE: CPT | Performed by: EMERGENCY MEDICINE

## 2025-08-19 PROCEDURE — 80053 COMPREHEN METABOLIC PANEL: CPT

## 2025-08-19 PROCEDURE — 96360 HYDRATION IV INFUSION INIT: CPT

## 2025-08-19 PROCEDURE — 85025 COMPLETE CBC W/AUTO DIFF WBC: CPT

## 2025-08-19 PROCEDURE — 80053 COMPREHEN METABOLIC PANEL: CPT | Performed by: EMERGENCY MEDICINE

## 2025-08-19 PROCEDURE — 83690 ASSAY OF LIPASE: CPT

## 2025-08-19 PROCEDURE — 85025 COMPLETE CBC W/AUTO DIFF WBC: CPT | Performed by: EMERGENCY MEDICINE

## 2025-08-19 PROCEDURE — 74177 CT ABD & PELVIS W/CONTRAST: CPT | Performed by: EMERGENCY MEDICINE

## 2025-08-19 RX ORDER — DICYCLOMINE HCL 20 MG
20 TABLET ORAL 4 TIMES DAILY PRN
Qty: 30 TABLET | Refills: 0 | Status: SHIPPED | OUTPATIENT
Start: 2025-08-19 | End: 2025-09-18

## 2025-08-19 RX ORDER — LORAZEPAM 0.5 MG/1
0.5 TABLET ORAL 2 TIMES DAILY PRN
Qty: 10 TABLET | Refills: 0 | Status: SHIPPED | OUTPATIENT
Start: 2025-08-19 | End: 2025-08-26

## 2025-08-19 RX ORDER — DICYCLOMINE HYDROCHLORIDE 10 MG/ML
10 INJECTION INTRAMUSCULAR ONCE
Status: COMPLETED | OUTPATIENT
Start: 2025-08-19 | End: 2025-08-19

## (undated) DEVICE — KENDALL SCD EXPRESS SLEEVES, KNEE LENGTH, MEDIUM: Brand: KENDALL SCD

## (undated) DEVICE — OPEN-END FLEXI-TIP URETERAL CATHETER: Brand: FLEXI-TIP

## (undated) DEVICE — NITINOL WIRE STR 035

## (undated) DEVICE — Device

## (undated) DEVICE — SOL  .9 3000ML

## (undated) DEVICE — CYSTO CDS-LF: Brand: MEDLINE INDUSTRIES, INC.

## (undated) DEVICE — 3M™ TEGADERM™ TRANSPARENT FILM DRESSING, 1626W, 4 IN X 4-3/4 IN (10 CM X 12 CM), 50 EACH/CARTON, 4 CARTON/CASE: Brand: 3M™ TEGADERM™

## (undated) DEVICE — NITINOL WIRE STR STIFF 038

## (undated) DEVICE — NITINOL WIRE ANGLED 038

## (undated) DEVICE — TIGERTAIL 5F FLXTIP 70CM

## (undated) DEVICE — GLOVE SURG SENSICARE SZ 7

## (undated) DEVICE — 273 SINGLE USE LASER FIBER

## (undated) DEVICE — SEAL Y BIOPSY PORT P6R SCOPE

## (undated) NOTE — LETTER
Date: 9/3/2018    RE: Ernesto Lr  : 1965    To Whom It May Concern:    Please excuse Ernesto Lr from work on 9/3/18 - 18. She was recently hospitalized at our facility during this time.      If you require additional information, pl

## (undated) NOTE — ED AVS SNAPSHOT
Angel Lofton   MRN: [id number]    Department:  THE [facility] in [place]   Date of Visit:  2/18/2018           Disclosure     Insurance plans vary and the physician(s) referred by the ER may not be covered by your plan.  Please conta tell this physician (or your personal doctor if your instructions are to return to your personal doctor) about any new or lasting problems. The primary care or specialist physician will see patients referred from the [facility] Emergency Department.  Anahy Dunn

## (undated) NOTE — ED AVS SNAPSHOT
[address] [facility] in [address] [place] [address]  Phone:  [phone]  Fax:  [address]   MRN: [id number]    Department:  [address] [facility] Department in [place]   Date of Visit:  7/1/201 IF THERE IS ANY CHANGE OR WORSENING OF YOUR CONDITION, CALL YOUR PRIMARY CARE PHYSICIAN AT ONCE OR RETURN IMMEDIATELY TO THE EMERGENCY DEPARTMENT.     If you have been prescribed any medication(s), please fill your prescription right away and begin taking t

## (undated) NOTE — LETTER
Date & Time: 8/16/2021, 7:16 PM  Patient: Heena Gerard  Encounter Provider(s):    Tim Styles MD  [place], [place]       To Whom It May Concern:    Mae Roldan was seen and treated in our department on 8/16/2021. She can return to work.     I

## (undated) NOTE — LETTER
[facility]  Ayad Zuluaga 63 [address] [facility], [address] [place] [address]    Consent for Operation    Date: __________________    Time: _______________    1.  I authorize the performance upon Rico Pitcher the following operation:    Procedure(s):  CYSTOSCOPY, RIGHT U revealed by the pictures or by descriptive texts accompanying them. If the procedure has been videotaped, the surgeon will obtain the original videotape. The hospital will not be responsible for storage or maintenance of this tape.     6. For the purpose of THAT MY DOCTOR PROVIDED ME WITH THE ABOVE EXPLANATIONS, THAT ALL BLANKS OR STATEMENTS REQUIRING INSERTION OR COMPLETION WERE FILLED IN.     Signature of Patient:   ___________________________    When the patient is a minor or mentally incompetent to give co · All of the medicines I take (including prescriptions, herbal supplements, and pills I can buy without a prescription (including street drugs/illegal medications).  Failure to inform my anesthesiologist about these medicines may increase my risk of anesthe _____________________________________________________________________________  Anesthesiologist Signature     Date   Time  I have discussed the procedure and information above with the patient (or patient’s representative) and answered their questions.  The